# Patient Record
Sex: FEMALE | Race: WHITE | Employment: PART TIME | URBAN - METROPOLITAN AREA
[De-identification: names, ages, dates, MRNs, and addresses within clinical notes are randomized per-mention and may not be internally consistent; named-entity substitution may affect disease eponyms.]

---

## 2017-10-10 ENCOUNTER — OFFICE VISIT (OUTPATIENT)
Dept: FAMILY MEDICINE | Facility: CLINIC | Age: 22
End: 2017-10-10
Payer: COMMERCIAL

## 2017-10-10 VITALS
HEIGHT: 68 IN | BODY MASS INDEX: 25.46 KG/M2 | SYSTOLIC BLOOD PRESSURE: 128 MMHG | DIASTOLIC BLOOD PRESSURE: 75 MMHG | OXYGEN SATURATION: 98 % | WEIGHT: 168 LBS | HEART RATE: 96 BPM | TEMPERATURE: 97.9 F

## 2017-10-10 DIAGNOSIS — G43.809 OTHER MIGRAINE WITHOUT STATUS MIGRAINOSUS, NOT INTRACTABLE: Primary | ICD-10-CM

## 2017-10-10 DIAGNOSIS — Z11.3 SCREENING EXAMINATION FOR VENEREAL DISEASE: ICD-10-CM

## 2017-10-10 DIAGNOSIS — F41.9 ANXIETY: ICD-10-CM

## 2017-10-10 LAB
ERYTHROCYTE [DISTWIDTH] IN BLOOD BY AUTOMATED COUNT: 13 % (ref 10–15)
HCT VFR BLD AUTO: 42.3 % (ref 35–47)
HGB BLD-MCNC: 14.3 G/DL (ref 11.7–15.7)
MCH RBC QN AUTO: 31.2 PG (ref 26.5–33)
MCHC RBC AUTO-ENTMCNC: 33.8 G/DL (ref 31.5–36.5)
MCV RBC AUTO: 92 FL (ref 78–100)
PLATELET # BLD AUTO: 345 10E9/L (ref 150–450)
RBC # BLD AUTO: 4.58 10E12/L (ref 3.8–5.2)
WBC # BLD AUTO: 7.1 10E9/L (ref 4–11)

## 2017-10-10 PROCEDURE — 36415 COLL VENOUS BLD VENIPUNCTURE: CPT | Performed by: PHYSICIAN ASSISTANT

## 2017-10-10 PROCEDURE — 87591 N.GONORRHOEAE DNA AMP PROB: CPT | Performed by: PHYSICIAN ASSISTANT

## 2017-10-10 PROCEDURE — 85027 COMPLETE CBC AUTOMATED: CPT | Performed by: PHYSICIAN ASSISTANT

## 2017-10-10 PROCEDURE — 99214 OFFICE O/P EST MOD 30 MIN: CPT | Performed by: PHYSICIAN ASSISTANT

## 2017-10-10 PROCEDURE — 87491 CHLMYD TRACH DNA AMP PROBE: CPT | Performed by: PHYSICIAN ASSISTANT

## 2017-10-10 RX ORDER — PROPRANOLOL HYDROCHLORIDE 80 MG/1
80 CAPSULE, EXTENDED RELEASE ORAL DAILY
Qty: 30 CAPSULE | Refills: 1 | Status: SHIPPED | OUTPATIENT
Start: 2017-10-10 | End: 2018-02-05

## 2017-10-10 RX ORDER — SUMATRIPTAN 25 MG/1
25-50 TABLET, FILM COATED ORAL
Qty: 18 TABLET | Refills: 1 | Status: SHIPPED | OUTPATIENT
Start: 2017-10-10 | End: 2017-12-16

## 2017-10-10 NOTE — PROGRESS NOTES
SUBJECTIVE:   Yenny Ortiz is a 21 year old female who presents to clinic today for the following health issues:      Headache  Onset: Mutliple Years, getting worse again recently     Description:   Location: bilateral in the occipital area, mostly behind left eye   Character: throbbing pain, sharp pain, squeezing pain  Frequency:  Every few days  Duration:  The whole day    Intensity: severe    Progression of Symptoms:  worsening    Accompanying Signs & Symptoms:  Stiff neck: no  Neck or upper back pain: no   Fever: no   Sinus pressure: no   Nausea or vomiting: no   Dizziness: YES-room spinning and eyes hurt   Numbness: YES-side of face and hand-left side  Weakness: YES  Visual changes: YES    History:   Head trauma: no   Family history of migraines: YES- grandmother  Previous tests for headaches: YES  Neurologist evaluations: YES-MRI was normal.  This was when she was 17.  Since then the sharp pain in eye and numbness is new   Able to do daily activities: no   Wake with a headaches: YES  Do headaches wake you up: no   Daily pain medication use: YES- OTC tylenol and ibuprofen   Work/school stressors/changes: no    Precipitating factors:   Does light make it worse: YES  Does sound make it worse: YES    Alleviating factors:  Does sleep help: YES- sometimes it does and sometimes it doesn't    Therapies Tried and outcome: Ibuprofen (Advil, Motrin) and Tylenol-not helping  Not related to cycle or sleep.  Drinks coffee-lots.  Not related to intake.  Sometimes drinking more helps.  Vision fine otherwise.     Some chest pain or shortness of breath-thinks it is anxiety related          Problem list and histories reviewed & adjusted, as indicated.  Additional history: as documented    Patient Active Problem List   Diagnosis     Contraception     History reviewed. No pertinent surgical history.    Social History   Substance Use Topics     Smoking status: Never Smoker     Smokeless tobacco: Never Used     Alcohol use  "No     History reviewed. No pertinent family history.          Reviewed and updated as needed this visit by clinical staffTobajose  Meds  Med Hx  Surg Hx  Fam Hx  Soc Hx      Reviewed and updated as needed this visit by Provider         ROS:  As above    OBJECTIVE:     /75 (BP Location: Left arm, Patient Position: Chair, Cuff Size: Adult Regular)  Pulse 96  Temp 97.9  F (36.6  C) (Oral)  Ht 5' 8.27\" (1.734 m)  Wt 168 lb (76.2 kg)  LMP 09/25/2017  SpO2 98%  Breastfeeding? No  BMI 25.34 kg/m2  Body mass index is 25.34 kg/(m^2).  GENERAL: healthy, alert and no distress  EYES: Eyes grossly normal to inspection, PERRL and conjunctivae and sclerae normal  NECK: no adenopathy, no asymmetry, masses, or scars and thyroid normal to palpation  RESP: lungs clear to auscultation - no rales, rhonchi or wheezes  CV: regular rates and rhythm, normal S1 S2, no S3 or S4 and no murmur, click or rub  NEURO: Normal strength and tone, mentation intact, cranial nerves 2-12 intact and DTR's normal and symmetric knees, finger to nose normal, alternating movements normal   PSYCH: mentation appears normal, affect normal/bright    Diagnostic Test Results:  none     ASSESSMENT/PLAN:       1. Other migraine without status migrainosus, not intractable  Follow up after labs are back but sounds like a migraine.  Start propanolol to help with anxiety as well.  Monitor pulse and blood pressure.  imitrex as needed  - CBC with platelets  - SUMAtriptan (IMITREX) 25 MG tablet; Take 1-2 tablets (25-50 mg) by mouth at onset of headache for migraine May repeat in 2 hours. Max 8 tablets/24 hours.  Dispense: 18 tablet; Refill: 1  - propranolol (INDERAL LA) 80 MG 24 hr capsule; Take 1 capsule (80 mg) by mouth daily  Dispense: 30 capsule; Refill: 1    2. Screening examination for venereal disease    - NEISSERIA GONORRHOEA PCR  - CHLAMYDIA TRACHOMATIS PCR    3. Anxiety  As above      FUTURE APPOINTMENTS:       - Follow-up visit in 1 " month    Lise Mancilla PA-C  [facility]

## 2017-10-10 NOTE — MR AVS SNAPSHOT
"              After Visit Summary   10/10/2017    Yenny Ortiz    MRN: [id number]           Patient Information     Date Of Birth          1995        Visit Information        Provider Department      10/10/2017 11:40 AM Lise Mancilla PA-C [facility]        Today's Diagnoses     Other migraine without status migrainosus, not intractable    -  1    Screening examination for venereal disease           Follow-ups after your visit        Follow-up notes from your care team     Return in about 4 weeks (around 11/7/2017) for migraines .      Who to contact     If you have questions or need follow up information about today's clinic visit or your schedule please contact [facility] directly at [phone].  Normal or non-critical lab and imaging results will be communicated to you by [employer], letter or phone within 4 business days after the clinic has received the results. If you do not hear from us within 7 days, please contact the clinic through [employer]hart or phone. If you have a critical or abnormal lab result, we will notify you by phone as soon as possible.  Submit refill requests through [employer] or call your pharmacy and they will forward the refill request to us. Please allow 3 business days for your refill to be completed.          Additional Information About Your Visit        MyChart Information     [employer] lets you send messages to your doctor, view your test results, renew your prescriptions, schedule appointments and more. To sign up, go to [email or web address] . Click on \"Log in\" on the left side of the screen, which will take you to the Welcome page. Then click on \"Sign up Now\" on the right side of the page.     You will be asked to enter the access code listed below, as well as some personal information. Please follow the directions to create your username and password.     Your access code is: [email or web address]  Expires: 1/8/2018 12:25 PM   " "  Your access code will  in 90 days. If you need help or a new code, please call your [facility] clinic or [phone].        Care EveryWhere ID     This is your Care EveryWhere ID. This could be used by other organizations to access your [facility] medical records  [id number]        Your Vitals Were     Pulse Temperature Height Last Period Pulse Oximetry Breastfeeding?    96 97.9  F (36.6  C) (Oral) 5' 8.27\" (1.734 m) 2017 98% No    BMI (Body Mass Index)                   25.34 kg/m2            Blood Pressure from Last 3 Encounters:   10/10/17 128/75   16 134/82   04/15/16 124/68    Weight from Last 3 Encounters:   10/10/17 168 lb (76.2 kg) (91 %)*   16 175 lb (79.4 kg) (94 %)*   04/15/16 174 lb (78.9 kg) (94 %)*     * Growth percentiles are based on [facility][note 2] 2-20 Years data.              We Performed the Following     CBC with platelets     CHLAMYDIA TRACHOMATIS PCR     NEISSERIA GONORRHOEA PCR          Today's Medication Changes          These changes are accurate as of: 10/10/17 12:25 PM.  If you have any questions, ask your nurse or doctor.               Start taking these medicines.        Dose/Directions    propranolol 80 MG 24 hr capsule   Commonly known as:  INDERAL LA   Used for:  Other migraine without status migrainosus, not intractable   Started by:  Lise Mancilla PA-C        Dose:  80 mg   Take 1 capsule (80 mg) by mouth daily   Quantity:  30 capsule   Refills:  1       SUMAtriptan 25 MG tablet   Commonly known as:  IMITREX   Used for:  Other migraine without status migrainosus, not intractable   Started by:  Lise Mancilla PA-C        Dose:  25-50 mg   Take 1-2 tablets (25-50 mg) by mouth at onset of headache for migraine May repeat in 2 hours. Max 8 tablets/24 hours.   Quantity:  18 tablet   Refills:  1            Where to get your medicines      These medications were sent to [facility]/pharmacy #[id number] - [place], [place] - [facility] [address] [facility] [address]     Phone:  [phone]     propranolol 80 MG 24 hr capsule    SUMAtriptan 25 MG tablet                Primary Care Provider    None Specified       No primary provider on file.        Equal Access to Services     CHE MANCINI : Hadii aad ku hadsabrina Hancock, waliamda luqadaha, qaybta kaalmada adedayna, will rodriguez chandnipatrick abdullahi alin vilchis. So [facility] [phone].    ATENCIÓN: Si habla español, tiene a polanco disposición servicios gratuitos de asistencia lingüística. Llame al [phone].    We comply with applicable federal civil rights laws and [place] laws. We do not discriminate on the basis of race, color, national origin, age, disability, sex, sexual orientation, or gender identity.            Thank you!     Thank you for choosing [facility]  for your care. Our goal is always to provide you with excellent care. Hearing back from our patients is one way we can continue to improve our services. Please take a few minutes to complete the written survey that you may receive in the mail after your visit with us. Thank you!             Your Updated Medication List - Protect others around you: Learn how to safely use, store and throw away your medicines at www.disposemymeds.org.          This list is accurate as of: 10/10/17 12:25 PM.  Always use your most recent med list.                   Brand Name Dispense Instructions for use Diagnosis    propranolol 80 MG 24 hr capsule    INDERAL LA    30 capsule    Take 1 capsule (80 mg) by mouth daily    Other migraine without status migrainosus, not intractable       SUMAtriptan 25 MG tablet    IMITREX    18 tablet    Take 1-2 tablets (25-50 mg) by mouth at onset of headache for migraine May repeat in 2 hours. Max 8 tablets/24 hours.    Other migraine without status migrainosus, not intractable

## 2017-10-10 NOTE — LETTER
[facility]  [address] [place]  [address]  [phone]        October 11, 2017    Yenny Ortiz  [facility] [address] [facility] [address]  [facility] [address]        Dear Yenny,    Your sexually transmitted disease testing and your blood work are normal.    Results for orders placed or performed in visit on 10/10/17   CBC with platelets   Result Value Ref Range    WBC 7.1 4.0 - 11.0 10e9/L    RBC Count 4.58 3.8 - 5.2 10e12/L    Hemoglobin 14.3 11.7 - 15.7 g/dL    Hematocrit 42.3 35.0 - 47.0 %    MCV 92 78 - 100 fl    MCH 31.2 26.5 - 33.0 pg    MCHC 33.8 31.5 - 36.5 g/dL    RDW 13.0 10.0 - 15.0 %    Platelet Count 345 150 - 450 10e9/L   NEISSERIA GONORRHOEA PCR   Result Value Ref Range    Specimen Descrip Urine     N Gonorrhea PCR Negative NEG^Negative   CHLAMYDIA TRACHOMATIS PCR   Result Value Ref Range    Specimen Description Urine     Chlamydia Trachomatis PCR Negative NEG^Negative       If you have any questions please call the clinic at [phone].    Sincerely,    Lise PELLETIER

## 2017-10-10 NOTE — NURSING NOTE
"Chief Complaint   Patient presents with     Headache     Health Maintenance     TD, Chlamydia, HPV, and Influenza        Initial /75 (BP Location: Left arm, Patient Position: Chair, Cuff Size: Adult Regular)  Pulse 96  Temp 97.9  F (36.6  C) (Oral)  Ht 5' 8.27\" (1.734 m)  Wt 168 lb (76.2 kg)  LMP 09/25/2017  SpO2 98%  Breastfeeding? No  BMI 25.34 kg/m2 Estimated body mass index is 25.34 kg/(m^2) as calculated from the following:    Height as of this encounter: 5' 8.27\" (1.734 m).    Weight as of this encounter: 168 lb (76.2 kg).  Medication Reconciliation: guera Green MA      "

## 2017-10-11 LAB
C TRACH DNA SPEC QL NAA+PROBE: NEGATIVE
N GONORRHOEA DNA SPEC QL NAA+PROBE: NEGATIVE
SPECIMEN SOURCE: NORMAL
SPECIMEN SOURCE: NORMAL

## 2017-12-16 DIAGNOSIS — G43.809 OTHER MIGRAINE WITHOUT STATUS MIGRAINOSUS, NOT INTRACTABLE: ICD-10-CM

## 2017-12-19 RX ORDER — SUMATRIPTAN 25 MG/1
TABLET, FILM COATED ORAL
Qty: 18 TABLET | Refills: 0 | Status: SHIPPED | OUTPATIENT
Start: 2017-12-19 | End: 2018-02-15

## 2017-12-19 NOTE — TELEPHONE ENCOUNTER
Requested Prescriptions   Pending Prescriptions Disp Refills     SUMAtriptan (IMITREX) 25 MG tablet [Pharmacy Med Name: SUMATRIPTAN SUCC 25 MG TABLET]                                             Last Written Prescription Date:  10/10/17  Last Fill Quantity: 18,  # refills: 1   [place] [facility] prescribing provider:  10/10/17   Future Office Visit:      18 tablet 1     Sig: TAKE 1-2 TABLETS BY MOUTH AT ONSET OF HEADACHE FORMIGRAINE. MAY REPEAT IN 2 HRS. MAX 8 TAB/24 HRS    There is no refill protocol information for this order        Routing refill request to provider for review/approval because:  Drug not on the [facility] [place]   Patient overdue for 1 month follow up    Kiarra Page RN  [facility]

## 2018-02-15 DIAGNOSIS — G43.809 OTHER MIGRAINE WITHOUT STATUS MIGRAINOSUS, NOT INTRACTABLE: ICD-10-CM

## 2018-02-15 RX ORDER — SUMATRIPTAN 25 MG/1
TABLET, FILM COATED ORAL
Qty: 18 TABLET | Refills: 0 | Status: SHIPPED | OUTPATIENT
Start: 2018-02-15 | End: 2018-05-01

## 2018-02-15 NOTE — TELEPHONE ENCOUNTER
"Requested Prescriptions   Pending Prescriptions Disp Refills     SUMAtriptan (IMITREX) 25 MG tablet [Pharmacy Med Name: SUMATRIPTAN SUCC 25 MG TABLET] 18 tablet 0    Last Written Prescription Date:  12-19-17  Last Fill Quantity: 18,  # refills: 0   Last office visit: 10/10/2017 with prescribing provider:     Future Office Visit:     Sig: TAKE 1 TO 2 TABS BY MOUTH AT ONSET OF HEADACHE,MAY REPEAT IN2HRS,MAX 8 IN 24HRS    Serotonin Agonists Failed    2/15/2018 11:13 AM       Failed - Serotonin Agonist request needs review.    Please review patient's record. If patient has had 8 or more treatments in the past month, please forward to provider.         Passed - Blood pressure under 140/90 in past 12 months    BP Readings from Last 3 Encounters:   10/10/17 128/75   05/24/16 134/82   04/15/16 124/68                Passed - Recent or future visit with authorizing provider's specialty    Patient had office visit in the last year or has a visit in the next 30 days with authorizing provider.  See \"Patient Info\" tab in inbasket, or \"Choose Columns\" in Meds & Orders section of the refill encounter.            Passed - Patient is age 18 or older       Passed - No active pregnancy on record       Passed - No positive pregnancy test in past 12 months          "

## 2018-02-15 NOTE — TELEPHONE ENCOUNTER
Medication is being filled for 1 time refill only due to:  Patient needs to be seen because last visit advise 1 mo.

## 2018-05-01 ENCOUNTER — TELEPHONE (OUTPATIENT)
Dept: FAMILY MEDICINE | Facility: CLINIC | Age: 23
End: 2018-05-01

## 2018-05-01 DIAGNOSIS — G43.809 OTHER MIGRAINE WITHOUT STATUS MIGRAINOSUS, NOT INTRACTABLE: ICD-10-CM

## 2018-05-01 NOTE — LETTER
[address] [address] [phone]      May 10, 2018    Yenny Ortiz                                                                                                                     [address] [place] [address] [address]  [facility] [address]            Dear Yenny,    We have tried to contact you, but have not been able to connect with you.    We were calling to let you know that we received a refill request for a medication.    We were able to send in a supply to your pharmacy, but the provider noted that you will need to be seen for a follow up in order to continue the medication.    Please call us at [phone] if you have any questions or to schedule an appointment.    Thank you      Sincerely,         Lise PELLETIER

## 2018-05-02 NOTE — TELEPHONE ENCOUNTER
"Requested Prescriptions   Pending Prescriptions Disp Refills     SUMAtriptan (IMITREX) 25 MG tablet [Pharmacy Med Name: SUMATRIPTAN SUCC 25 MG TABLET] 18 tablet 0    Last Written Prescription Date:  2-15-18  Last Fill Quantity: 18,  # refills: 0   Last office visit: 10/10/2017 with prescribing provider:     Future Office Visit:     Sig: TAKE 1 TO 2 TABS BY MOUTH AT ONSET OF HEADACHE,MAY REPEAT IN2HRS,MAX 8 IN 24HRS    Serotonin Agonists Failed    5/1/2018  3:43 PM       Failed - Serotonin Agonist request needs review.    Please review patient's record. If patient has had 8 or more treatments in the past month, please forward to provider.         Passed - Blood pressure under 140/90 in past 12 months    BP Readings from Last 3 Encounters:   10/10/17 128/75   05/24/16 134/82   04/15/16 124/68                Passed - Recent (12 mo) or future (30 days) visit within the authorizing provider's specialty    Patient had office visit in the last 12 months or has a visit in the next 30 days with authorizing provider or within the authorizing provider's specialty.  See \"Patient Info\" tab in inbasket, or \"Choose Columns\" in Meds & Orders section of the refill encounter.           Passed - Patient is age 18 or older       Passed - No active pregnancy on record       Passed - No positive pregnancy test in past 12 months        propranolol (INDERAL LA) 80 MG 24 hr capsule [Pharmacy Med Name: PROPRANOLOL ER 80 MG CAPSULE] 15 capsule 0    Last Written Prescription Date:  3-30-18  Last Fill Quantity: 15,  # refills: 0   Last office visit: 10/10/2017 with prescribing provider:     Future Office Visit:     Sig: TAKE 1 CAPSULE (80 MG) BY MOUTH DAILY DUE FOR OFFICE VISIT    Beta-Blockers Protocol Passed    5/1/2018  3:43 PM       Passed - Blood pressure under 140/90 in past 12 months    BP Readings from Last 3 Encounters:   10/10/17 128/75   05/24/16 134/82   04/15/16 124/68                Passed - Patient is age 6 or older       Passed " "- Recent (12 mo) or future (30 days) visit within the authorizing provider's specialty    Patient had office visit in the last 12 months or has a visit in the next 30 days with authorizing provider or within the authorizing provider's specialty.  See \"Patient Info\" tab in inbasket, or \"Choose Columns\" in Meds & Orders section of the refill encounter.              "

## 2018-05-04 RX ORDER — SUMATRIPTAN 25 MG/1
TABLET, FILM COATED ORAL
Qty: 9 TABLET | Refills: 0 | Status: SHIPPED | OUTPATIENT
Start: 2018-05-04 | End: 2018-07-13

## 2018-05-04 RX ORDER — PROPRANOLOL HYDROCHLORIDE 80 MG/1
CAPSULE, EXTENDED RELEASE ORAL
Qty: 15 CAPSULE | Refills: 0 | OUTPATIENT
Start: 2018-05-04

## 2018-05-04 NOTE — TELEPHONE ENCOUNTER
Message left on home number for patient to call back TC line.  NTBS for 6 month, migraine check with provider.    Talya BALTAZAR

## 2018-05-16 ENCOUNTER — TELEPHONE (OUTPATIENT)
Dept: FAMILY MEDICINE | Facility: CLINIC | Age: 23
End: 2018-05-16

## 2018-05-16 DIAGNOSIS — G43.809 OTHER MIGRAINE WITHOUT STATUS MIGRAINOSUS, NOT INTRACTABLE: ICD-10-CM

## 2018-05-16 RX ORDER — PROPRANOLOL HYDROCHLORIDE 80 MG/1
CAPSULE, EXTENDED RELEASE ORAL
Qty: 15 CAPSULE | Refills: 0 | OUTPATIENT
Start: 2018-05-16

## 2018-05-16 RX ORDER — SUMATRIPTAN 25 MG/1
TABLET, FILM COATED ORAL
Qty: 18 TABLET | Refills: 0 | OUTPATIENT
Start: 2018-05-16

## 2018-05-16 NOTE — TELEPHONE ENCOUNTER
"Requested Prescriptions   Pending Prescriptions Disp Refills     propranolol (INDERAL LA) 80 MG 24 hr capsule [Pharmacy Med Name: PROPRANOLOL ER 80 MG CAPSULE] 15 capsule 0    Last Written Prescription Date:  3-30-18  Last Fill Quantity: 15,  # refills: 0   Last office visit: 10/10/2017 with prescribing provider:     Future Office Visit:     Sig: TAKE 1 CAPSULE (80 MG) BY MOUTH DAILY DUE FOR OFFICE VISIT    Beta-Blockers Protocol Passed    5/16/2018  9:59 AM       Passed - Blood pressure under 140/90 in past 12 months    BP Readings from Last 3 Encounters:   10/10/17 128/75   05/24/16 134/82   04/15/16 124/68                Passed - Patient is age 6 or older       Passed - Recent (12 mo) or future (30 days) visit within the authorizing provider's specialty    Patient had office visit in the last 12 months or has a visit in the next 30 days with authorizing provider or within the authorizing provider's specialty.  See \"Patient Info\" tab in inbasket, or \"Choose Columns\" in Meds & Orders section of the refill encounter.            SUMAtriptan (IMITREX) 25 MG tablet [Pharmacy Med Name: SUMATRIPTAN SUCC 25 MG TABLET] 18 tablet 0    Last Written Prescription Date:  5-4-18  Last Fill Quantity: 9,  # refills: 0   Last office visit: 10/10/2017 with prescribing provider:     Future Office Visit:     Sig: TAKE 1 TO 2 TABS BY MOUTH AT ONSET OF HEADACHE,MAY REPEAT IN2HRS,MAX 8 IN 24HRS    Serotonin Agonists Failed    5/16/2018  9:59 AM       Failed - Serotonin Agonist request needs review.    Please review patient's record. If patient has had 8 or more treatments in the past month, please forward to provider.         Passed - Blood pressure under 140/90 in past 12 months    BP Readings from Last 3 Encounters:   10/10/17 128/75   05/24/16 134/82   04/15/16 124/68                Passed - Recent (12 mo) or future (30 days) visit within the authorizing provider's specialty    Patient had office visit in the last 12 months or has a " "visit in the next 30 days with authorizing provider or within the authorizing provider's specialty.  See \"Patient Info\" tab in inbasket, or \"Choose Columns\" in Meds & Orders section of the refill encounter.           Passed - Patient is age 18 or older       Passed - No active pregnancy on record       Passed - No positive pregnancy test in past 12 months          "

## 2018-05-16 NOTE — TELEPHONE ENCOUNTER
"Refused medications as \"patient needs appointment\".  Last OV was 10/10/2017  On 2/23/2018 patient no showed scheduled appointment.    Routing to provider  Fabiola Uribe RN  [facility]      "

## 2018-05-23 ENCOUNTER — OFFICE VISIT (OUTPATIENT)
Dept: FAMILY MEDICINE | Facility: CLINIC | Age: 23
End: 2018-05-23

## 2018-05-23 VITALS
WEIGHT: 170 LBS | OXYGEN SATURATION: 96 % | HEIGHT: 68 IN | DIASTOLIC BLOOD PRESSURE: 79 MMHG | HEART RATE: 104 BPM | TEMPERATURE: 97.8 F | SYSTOLIC BLOOD PRESSURE: 119 MMHG | BODY MASS INDEX: 25.76 KG/M2

## 2018-05-23 DIAGNOSIS — G43.809 OTHER MIGRAINE WITHOUT STATUS MIGRAINOSUS, NOT INTRACTABLE: ICD-10-CM

## 2018-05-23 DIAGNOSIS — R07.0 THROAT PAIN: ICD-10-CM

## 2018-05-23 DIAGNOSIS — R09.89 UPPER RESPIRATORY SYMPTOM: Primary | ICD-10-CM

## 2018-05-23 LAB
DEPRECATED S PYO AG THROAT QL EIA: NORMAL
SPECIMEN SOURCE: NORMAL

## 2018-05-23 PROCEDURE — 87081 CULTURE SCREEN ONLY: CPT | Performed by: FAMILY MEDICINE

## 2018-05-23 PROCEDURE — 99213 OFFICE O/P EST LOW 20 MIN: CPT | Performed by: FAMILY MEDICINE

## 2018-05-23 PROCEDURE — 87880 STREP A ASSAY W/OPTIC: CPT | Performed by: FAMILY MEDICINE

## 2018-05-23 RX ORDER — SUMATRIPTAN 25 MG/1
TABLET, FILM COATED ORAL
Qty: 9 TABLET | Refills: 0 | Status: CANCELLED | OUTPATIENT
Start: 2018-05-23

## 2018-05-23 RX ORDER — PROPRANOLOL HYDROCHLORIDE 80 MG/1
CAPSULE, EXTENDED RELEASE ORAL
Qty: 15 CAPSULE | Refills: 0 | Status: SHIPPED | OUTPATIENT
Start: 2018-05-23 | End: 2018-07-13

## 2018-05-23 ASSESSMENT — PAIN SCALES - GENERAL: PAINLEVEL: SEVERE PAIN (7)

## 2018-05-23 NOTE — LETTER
[facility]   [address] [place]  [address]  [phone]                                   May 24, 2018    Yenny Ortiz  [address] [place] [address] [address] [facility] [address]  [facility] [address]        Dear Yenny,    Your throat culture is normal, you do not have strep throat.     Results for orders placed or performed in visit on 05/23/18   Strep, Rapid Screen   Result Value Ref Range    Specimen Description Throat     Rapid Strep A Screen       NEGATIVE: No Group A streptococcal antigen detected by immunoassay, await culture report.   Beta strep group A culture   Result Value Ref Range    Specimen Description Throat     Culture Micro No beta hemolytic Streptococcus Group A isolated        If you have any questions please call the clinic at [phone]    Sincerely,    Olya Mcnamara MD  bmd

## 2018-05-23 NOTE — LETTER
[facility]   [address] [place]  [address]  [phone]                                   May 24, 2018    Yenny Ortiz  [address] [place] [address] [address] [facility] [address]  [facility] [address]        Dear Yenny,    Your throat culture is normal, you do not have strep throat.     Results for orders placed or performed in visit on 05/23/18   Strep, Rapid Screen   Result Value Ref Range    Specimen Description Throat     Rapid Strep A Screen       NEGATIVE: No Group A streptococcal antigen detected by immunoassay, await culture report.   Beta strep group A culture   Result Value Ref Range    Specimen Description Throat     Culture Micro No beta hemolytic Streptococcus Group A isolated        If you have any questions please call the clinic at [phone]    Sincerely,    Olya Mcnamara MD  bmd

## 2018-05-23 NOTE — PROGRESS NOTES
Results discussed with patient during the clinic visit.     .Olya Mcnamara MD.   Family Physician.  [facility].

## 2018-05-23 NOTE — PROGRESS NOTES
SUBJECTIVE:   Yenny Ortiz is a 22 year old female who presents to clinic today for the following health issues:    ENT Symptoms :            Symptoms: cc Present Absent Comment   Fever/Chills   x    Fatigue   x    Muscle Aches   x    Eye Irritation   x    Sneezing  x     Nasal Sergio/Drg  x     Sinus Pressure/Pain   x    Loss of smell   x    Dental pain   x    Sore Throat  x     Swollen Glands  x  Left side   Ear Pain/Fullness   x    Cough  x     Wheeze   x    Chest Pain   x    Shortness of breath   x    Rash   x    Other         Symptom duration:  3 days   Symptom severity:  moderate   Treatments tried:  tylenol   Contacts:  sister.       Problem list and histories reviewed & adjusted, as indicated.  Additional history: as documented    Patient Active Problem List   Diagnosis     Contraception     Other migraine without status migrainosus, not intractable     Anxiety     History reviewed. No pertinent surgical history.    Social History   Substance Use Topics     Smoking status: Never Smoker     Smokeless tobacco: Never Used     Alcohol use No     History reviewed. No pertinent family history.      Current Outpatient Prescriptions   Medication Sig Dispense Refill     propranolol (INDERAL LA) 80 MG 24 hr capsule TAKE 1 CAPSULE (80 MG) BY MOUTH DAILY DUE FOR OFFICE VISIT (Patient not taking: Reported on 5/23/2018) 15 capsule 0     SUMAtriptan (IMITREX) 25 MG tablet TAKE 1 TO 2 TABS BY MOUTH AT ONSET OF HEADACHE,MAY REPEAT IN2HRS,MAX 8 IN 24HRS (Patient not taking: Reported on 5/23/2018) 9 tablet 0     Allergies   Allergen Reactions     Amoxicillin      No lab results found.   BP Readings from Last 3 Encounters:   05/23/18 119/79   10/10/17 128/75   05/24/16 134/82    Wt Readings from Last 3 Encounters:   05/23/18 170 lb (77.1 kg)   10/10/17 168 lb (76.2 kg) (91 %)*   05/24/16 175 lb (79.4 kg) (94 %)*     * Growth percentiles are based on CDC 2-20 Years data.                  Labs reviewed in EPIC    Reviewed  "and updated as needed this visit by clinical staff       Reviewed and updated as needed this visit by Provider         ROS:  Constitutional, HEENT, cardiovascular, pulmonary, gi and gu systems are negative, except as otherwise noted.    OBJECTIVE:     /79 (BP Location: Right arm, Patient Position: Sitting, Cuff Size: Adult Regular)  Pulse 104  Temp 97.8  F (36.6  C) (Oral)  Ht 5' 8.25\" (1.734 m)  Wt 170 lb (77.1 kg)  SpO2 96%  BMI 25.66 kg/m2  Body mass index is 25.66 kg/(m^2).  GENERAL: healthy, alert and no distress  NECK: no adenopathy, no asymmetry, masses, or scars and thyroid normal to palpation  RESP: lungs clear to auscultation - no rales, rhonchi or wheezes  CV: regular rate and rhythm, normal S1 S2, no S3 or S4, no murmur, click or rub, no peripheral edema and peripheral pulses strong  ABDOMEN: soft, nontender, no hepatosplenomegaly, no masses and bowel sounds normal  MS: no gross musculoskeletal defects noted, no edema    Results for orders placed or performed in visit on 05/23/18   Strep, Rapid Screen   Result Value Ref Range    Specimen Description Throat     Rapid Strep A Screen       NEGATIVE: No Group A streptococcal antigen detected by immunoassay, await culture report.         ASSESSMENT/PLAN:       ICD-10-CM    1. Upper respiratory symptom R09.89    2. Throat pain R07.0 Strep, Rapid Screen     Beta strep group A culture   3. Other migraine without status migrainosus, not intractable G43.809 propranolol (INDERAL LA) 80 MG 24 hr capsule     Symptomatic Rx. F/U PRN.     Scheduled to see PCP for migraine f/u.       Olya Mcnamara MD  [facility]  "

## 2018-05-23 NOTE — MR AVS SNAPSHOT
After Visit Summary   5/23/2018    Yenny Ortiz    MRN: [id number]           Patient Information     Date Of Birth          1995        Visit Information        Provider Department      5/23/2018 11:20 AM Olya Mcnamara MD [facility]        Today's Diagnoses     Viral upper respiratory tract infection    -  1    Throat pain        Other migraine without status migrainosus, not intractable           Follow-ups after your visit        Your next 10 appointments already scheduled     Jun 06, 2018 11:00 AM CDT   Office Visit with Lise Mancilla PA-C   [facility] ([facility])    [address] [facility] [id number] [phone]           Bring a current list of meds and any records pertaining to this visit. For Physicals, please bring immunization records and any forms needing to be filled out. Please arrive 10 minutes early to complete paperwork.              Who to contact     If you have questions or need follow up information about today's clinic visit or your schedule please contact [facility] directly at [phone].  Normal or non-critical lab and imaging results will be communicated to you by [employer], letter or phone within 4 business days after the clinic has received the results. If you do not hear from us within 7 days, please contact the clinic through [employer] or phone. If you have a critical or abnormal lab result, we will notify you by phone as soon as possible.  Submit refill requests through [employer] or call your pharmacy and they will forward the refill request to us. Please allow 3 business days for your refill to be completed.          Additional Information About Your Visit        [employer] Information     [employer] lets you send messages to your doctor, view your test results, renew your prescriptions, schedule appointments and more. To sign  "up, go to [email or web address] . Click on \"Log in\" on the left side of the screen, which will take you to the Welcome page. Then click on \"Sign up Now\" on the right side of the page.     You will be asked to enter the access code listed below, as well as some personal information. Please follow the directions to create your username and password.     Your access code is: [email or web address]  Expires: 2018  3:29 PM     Your access code will  in 90 days. If you need help or a new code, please call your [place] clinic or [phone].        Care EveryWhere ID     This is your Care EveryWhere ID. This could be used by other organizations to access your [place] medical records  [id number]        Your Vitals Were     Pulse Temperature Height Pulse Oximetry BMI (Body Mass Index)       104 97.8  F (36.6  C) (Oral) 5' 8.25\" (1.734 m) 96% 25.66 kg/m2        Blood Pressure from Last 3 Encounters:   18 119/79   10/10/17 128/75   16 134/82    Weight from Last 3 Encounters:   18 170 lb (77.1 kg)   10/10/17 168 lb (76.2 kg) (91 %)*   16 175 lb (79.4 kg) (94 %)*     * Growth percentiles are based on CDC 2-20 Years data.              We Performed the Following     Beta strep group A culture     Strep, Rapid Screen          Where to get your medicines      These medications were sent to [facility]/pharmacy #[id number] - [place] [address]NER OF [address] [place] [address]     Phone:  [phone]     propranolol 80 MG 24 hr capsule          Primary Care Provider Office Phone # Fax #    [facility] [phone]       [address] [place] [address]        Equal Access to Services     CHE MANCINI : princess An qaybta kaalmada adeegyada, waxay idiin hayaan adeeg kharash la'aan ah. So [facility] [phone].    ATENCIÓN: Si habla español, tiene a polanco disposición servicios gratuitos de asistencia " lingüística. Jeromy al [phone].    We comply with applicable federal civil rights laws and [place] laws. We do not discriminate on the basis of race, color, national origin, age, disability, sex, sexual orientation, or gender identity.            Thank you!     Thank you for choosing [facility]  for your care. Our goal is always to provide you with excellent care. Hearing back from our patients is one way we can continue to improve our services. Please take a few minutes to complete the written survey that you may receive in the mail after your visit with us. Thank you!             Your Updated Medication List - Protect others around you: Learn how to safely use, store and throw away your medicines at www.disposemymeds.org.          This list is accurate as of 5/23/18 12:10 PM.  Always use your most recent med list.                   Brand Name Dispense Instructions for use Diagnosis    propranolol 80 MG 24 hr capsule    INDERAL LA    15 capsule    TAKE 1 CAPSULE (80 MG) BY MOUTH DAILY DUE FOR OFFICE VISIT    Other migraine without status migrainosus, not intractable       SUMAtriptan 25 MG tablet    IMITREX    9 tablet    TAKE 1 TO 2 TABS BY MOUTH AT ONSET OF HEADACHE,MAY REPEAT IN2HRS,MAX 8 IN 24HRS    Other migraine without status migrainosus, not intractable

## 2018-05-24 LAB
BACTERIA SPEC CULT: NORMAL
SPECIMEN SOURCE: NORMAL

## 2018-05-24 NOTE — PROGRESS NOTES
Dear Yenny Ortiz,     Your throat culture is normal, you do not have strep throat.     Olya Mcnamara MD.   Family Physician.  [facility] [place].

## 2018-05-24 NOTE — PROGRESS NOTES
Dear Yenny Ortiz,     Your throat culture is normal, you do not have strep throat.     Olya Mcnamara MD.   Family Physician.  [facility].

## 2018-07-13 ENCOUNTER — OFFICE VISIT (OUTPATIENT)
Dept: FAMILY MEDICINE | Facility: CLINIC | Age: 23
End: 2018-07-13
Payer: COMMERCIAL

## 2018-07-13 VITALS
WEIGHT: 173 LBS | DIASTOLIC BLOOD PRESSURE: 69 MMHG | BODY MASS INDEX: 26.11 KG/M2 | HEART RATE: 96 BPM | SYSTOLIC BLOOD PRESSURE: 104 MMHG | TEMPERATURE: 97.8 F

## 2018-07-13 DIAGNOSIS — F41.9 ANXIETY: ICD-10-CM

## 2018-07-13 DIAGNOSIS — L70.9 ACNE, UNSPECIFIED ACNE TYPE: ICD-10-CM

## 2018-07-13 DIAGNOSIS — F33.0 MILD EPISODE OF RECURRENT MAJOR DEPRESSIVE DISORDER (H): ICD-10-CM

## 2018-07-13 DIAGNOSIS — G43.809 OTHER MIGRAINE WITHOUT STATUS MIGRAINOSUS, NOT INTRACTABLE: Primary | ICD-10-CM

## 2018-07-13 PROCEDURE — 99214 OFFICE O/P EST MOD 30 MIN: CPT | Performed by: PHYSICIAN ASSISTANT

## 2018-07-13 RX ORDER — SERTRALINE HYDROCHLORIDE 25 MG/1
25 TABLET, FILM COATED ORAL DAILY
Qty: 30 TABLET | Refills: 0 | Status: SHIPPED | OUTPATIENT
Start: 2018-07-13 | End: 2018-08-21

## 2018-07-13 RX ORDER — CLINDAMYCIN PHOSPHATE 10 UG/ML
LOTION TOPICAL 2 TIMES DAILY
Qty: 60 ML | Refills: 1 | Status: SHIPPED | OUTPATIENT
Start: 2018-07-13 | End: 2019-02-06

## 2018-07-13 RX ORDER — SUMATRIPTAN 25 MG/1
TABLET, FILM COATED ORAL
Qty: 9 TABLET | Refills: 1 | Status: SHIPPED | OUTPATIENT
Start: 2018-07-13 | End: 2019-05-13

## 2018-07-13 RX ORDER — PROPRANOLOL HYDROCHLORIDE 80 MG/1
80 CAPSULE, EXTENDED RELEASE ORAL DAILY
Qty: 90 CAPSULE | Refills: 1 | Status: SHIPPED | OUTPATIENT
Start: 2018-07-13 | End: 2019-11-21

## 2018-07-13 ASSESSMENT — ANXIETY QUESTIONNAIRES
GAD7 TOTAL SCORE: 19
6. BECOMING EASILY ANNOYED OR IRRITABLE: NEARLY EVERY DAY
2. NOT BEING ABLE TO STOP OR CONTROL WORRYING: MORE THAN HALF THE DAYS
5. BEING SO RESTLESS THAT IT IS HARD TO SIT STILL: NEARLY EVERY DAY
IF YOU CHECKED OFF ANY PROBLEMS ON THIS QUESTIONNAIRE, HOW DIFFICULT HAVE THESE PROBLEMS MADE IT FOR YOU TO DO YOUR WORK, TAKE CARE OF THINGS AT HOME, OR GET ALONG WITH OTHER PEOPLE: VERY DIFFICULT
7. FEELING AFRAID AS IF SOMETHING AWFUL MIGHT HAPPEN: NEARLY EVERY DAY
1. FEELING NERVOUS, ANXIOUS, OR ON EDGE: NEARLY EVERY DAY
3. WORRYING TOO MUCH ABOUT DIFFERENT THINGS: NEARLY EVERY DAY

## 2018-07-13 ASSESSMENT — PATIENT HEALTH QUESTIONNAIRE - PHQ9: 5. POOR APPETITE OR OVEREATING: MORE THAN HALF THE DAYS

## 2018-07-13 NOTE — PROGRESS NOTES
SUBJECTIVE:   Yenny Ortiz is a 22 year old female who presents to clinic today for the following health issues:        Medication Followup of imitrex and refill     Taking Medication as prescribed: yes    Side Effects:  None    Medication Helping Symptoms:  yes   Getting headaches once a month.  No triggers.  No change in headaches.  No side effects from medications.  No dizziness.  No vision changes.      Anxiety is worsening.  Everything causes anxiety.  some depression.        Also concerned about acne.  Has had problems x years.  Tried lots of over the counter products.          Problem list and histories reviewed & adjusted, as indicated.  Additional history: as documented    Patient Active Problem List   Diagnosis     Contraception     Other migraine without status migrainosus, not intractable     Anxiety     Acne, unspecified acne type     History reviewed. No pertinent surgical history.    Social History   Substance Use Topics     Smoking status: Never Smoker     Smokeless tobacco: Never Used     Alcohol use No     History reviewed. No pertinent family history.        Reviewed and updated as needed this visit by clinical staff  Tobacco  Allergies  Meds  Med Hx  Surg Hx  Fam Hx  Soc Hx      Reviewed and updated as needed this visit by Provider         ROS:  As above    OBJECTIVE:     /69  Pulse 96  Temp 97.8  F (36.6  C) (Oral)  Wt 173 lb (78.5 kg)  LMP 06/13/2018  BMI 26.11 kg/m2  Body mass index is 26.11 kg/(m^2).  GENERAL: healthy, alert and no distress  HENT: ear canals and TM's normal, oropharynx clear and oral mucous membranes moist  NECK: no adenopathy, no asymmetry, masses, or scars and thyroid normal to palpation  RESP: lungs clear to auscultation - no rales, rhonchi or wheezes  CV: regular rates and rhythm, normal S1 S2, no S3 or S4 and no murmur, click or rub  SKIN: acne on face  NEURO: Normal strength and tone, mentation intact, cranial nerves 2-12 intact, DTR's normal  and symmetric knees and rapid alternating movements normal  PSYCH: mentation appears normal, affect normal/bright    Diagnostic Test Results:  none     ASSESSMENT/PLAN:       1. Other migraine without status migrainosus, not intractable  Stable.  refilled  - propranolol (INDERAL LA) 80 MG 24 hr capsule; Take 1 capsule (80 mg) by mouth daily  Dispense: 90 capsule; Refill: 1  - SUMAtriptan (IMITREX) 25 MG tablet; TAKE 1 TO 2 TABS BY MOUTH AT ONSET OF HEADACHE,MAY REPEAT IN2HRS,MAX 8 IN 24HRS  Dispense: 9 tablet; Refill: 1    2. Mild episode of recurrent major depressive disorder (H)  Start zoloft.  Follow up in 3 weeks    3. Anxiety  As above  - sertraline (ZOLOFT) 25 MG tablet; Take 1 tablet (25 mg) by mouth daily  Dispense: 30 tablet; Refill: 0    4. Acne, unspecified acne type  Continue over the counter face wash  - clindamycin (CLEOCIN-T) 1 % lotion; Apply topically 2 times daily  Dispense: 60 mL; Refill: 1    FUTURE APPOINTMENTS:       - Follow-up visit in 3 weeks    Lise Mancilla PA-C  [facility]

## 2018-07-13 NOTE — PROGRESS NOTES
"  SUBJECTIVE:   Yenny Ortiz is a 22 year old female who presents to clinic today for the following health issues:  {Provider please address medication reconciliation discrepancies--rooming staff please delete if no med/rec issues}    {Superlists:352637}    {additional problems for provider to add:095550}    Problem list and histories reviewed & adjusted, as indicated.  Additional history: {NONE - AS DOCUMENTED:674978::\"as documented\"}    {HIST REVIEW/ LINKS 2:440601}    Reviewed and updated as needed this visit by clinical staff       Reviewed and updated as needed this visit by Provider         {PROVIDER CHARTING PREFERENCE:621827}  "

## 2018-07-14 ASSESSMENT — PATIENT HEALTH QUESTIONNAIRE - PHQ9: SUM OF ALL RESPONSES TO PHQ QUESTIONS 1-9: 15

## 2018-07-14 ASSESSMENT — ANXIETY QUESTIONNAIRES: GAD7 TOTAL SCORE: 19

## 2018-07-20 ENCOUNTER — OFFICE VISIT (OUTPATIENT)
Dept: FAMILY MEDICINE | Facility: CLINIC | Age: 23
End: 2018-07-20
Payer: COMMERCIAL

## 2018-07-20 VITALS
DIASTOLIC BLOOD PRESSURE: 75 MMHG | SYSTOLIC BLOOD PRESSURE: 115 MMHG | TEMPERATURE: 98 F | OXYGEN SATURATION: 99 % | WEIGHT: 171 LBS | BODY MASS INDEX: 25.91 KG/M2 | HEIGHT: 68 IN | HEART RATE: 78 BPM

## 2018-07-20 DIAGNOSIS — R07.0 THROAT PAIN: Primary | ICD-10-CM

## 2018-07-20 LAB
DEPRECATED S PYO AG THROAT QL EIA: NORMAL
SPECIMEN SOURCE: NORMAL

## 2018-07-20 PROCEDURE — 87880 STREP A ASSAY W/OPTIC: CPT | Performed by: PHYSICIAN ASSISTANT

## 2018-07-20 PROCEDURE — 87081 CULTURE SCREEN ONLY: CPT | Performed by: PHYSICIAN ASSISTANT

## 2018-07-20 PROCEDURE — 99213 OFFICE O/P EST LOW 20 MIN: CPT | Performed by: PHYSICIAN ASSISTANT

## 2018-07-20 RX ORDER — FLUTICASONE PROPIONATE 50 MCG
1-2 SPRAY, SUSPENSION (ML) NASAL DAILY
Qty: 1 BOTTLE | Refills: 11 | Status: SHIPPED | OUTPATIENT
Start: 2018-07-20 | End: 2020-01-30

## 2018-07-20 NOTE — MR AVS SNAPSHOT
"              After Visit Summary   7/20/2018    Yenny Ortiz    MRN: [id number]           Patient Information     Date Of Birth          1995        Visit Information        Provider Department      7/20/2018 1:40 PM Lise Mancilla PA-C [facility]        Today's Diagnoses     Throat pain    -  1       Follow-ups after your visit        Who to contact     If you have questions or need follow up information about today's clinic visit or your schedule please contact [facility] directly at [phone].  Normal or non-critical lab and imaging results will be communicated to you by MyChart, letter or phone within 4 business days after the clinic has received the results. If you do not hear from us within 7 days, please contact the clinic through MyChart or phone. If you have a critical or abnormal lab result, we will notify you by phone as soon as possible.  Submit refill requests through [employer] or call your pharmacy and they will forward the refill request to us. Please allow 3 business days for your refill to be completed.          Additional Information About Your Visit        Care EveryWhere ID     This is your Care EveryWhere ID. This could be used by other organizations to access your [place] medical records  [id number]        Your Vitals Were     Pulse Temperature Height Pulse Oximetry BMI (Body Mass Index)       78 98  F (36.7  C) (Oral) 5' 8.25\" (1.734 m) 99% 25.81 kg/m2        Blood Pressure from Last 3 Encounters:   07/20/18 115/75   07/13/18 104/69   05/23/18 119/79    Weight from Last 3 Encounters:   07/20/18 171 lb (77.6 kg)   07/13/18 173 lb (78.5 kg)   05/23/18 170 lb (77.1 kg)              We Performed the Following     Beta strep group A culture     Rapid strep screen          Today's Medication Changes          These changes are accurate as of 7/20/18  2:19 PM.  If you have any questions, ask your nurse or doctor.             "   Start taking these medicines.        Dose/Directions    fluticasone 50 MCG/ACT spray   Commonly known as:  FLONASE   Used for:  Throat pain   Started by:  Lise Mancilla PA-C        Dose:  1-2 spray   Spray 1-2 sprays into both nostrils daily   Quantity:  1 Bottle   Refills:  11            Where to get your medicines      These medications were sent to [facility]/pharmacy #[id number] - [place] [address] OF [address] [place] [address]     Phone:  [phone]     fluticasone 50 MCG/ACT spray                Primary Care Provider Office Phone # Fax #    [facility] [phone]       [address] [facility] [address]        Equal Access to Services     CHE MANCINI : Hadii aad ku hadasho Soomaali, waaxda luqadaha, qaybta kaalmada adeegyada, waxnuno vilchis. So [facility] [phone].    ATENCIÓN: Si habla español, tiene a polanco disposición servicios gratuitos de asistencia lingüística. [facility] [phone].    We comply with applicable federal civil rights laws and [place] laws. We do not discriminate on the basis of race, color, national origin, age, disability, sex, sexual orientation, or gender identity.            Thank you!     Thank you for choosing [facility]  for your care. Our goal is always to provide you with excellent care. Hearing back from our patients is one way we can continue to improve our services. Please take a few minutes to complete the written survey that you may receive in the mail after your visit with us. Thank you!             Your Updated Medication List - Protect others around you: Learn how to safely use, store and throw away your medicines at www.disposemymeds.org.          This list is accurate as of 7/20/18  2:19 PM.  Always use your most recent med list.                   Brand Name Dispense Instructions for use Diagnosis    clindamycin 1 % lotion     CLEOCIN-T    60 mL    Apply topically 2 times daily    Acne, unspecified acne type       fluticasone 50 MCG/ACT spray    FLONASE    1 Bottle    Spray 1-2 sprays into both nostrils daily    Throat pain       propranolol 80 MG 24 hr capsule    INDERAL LA    90 capsule    Take 1 capsule (80 mg) by mouth daily    Other migraine without status migrainosus, not intractable       sertraline 25 MG tablet    ZOLOFT    30 tablet    Take 1 tablet (25 mg) by mouth daily    Anxiety       SUMAtriptan 25 MG tablet    IMITREX    9 tablet    TAKE 1 TO 2 TABS BY MOUTH AT ONSET OF HEADACHE,MAY REPEAT IN2HRS,MAX 8 IN 24HRS    Other migraine without status migrainosus, not intractable

## 2018-07-20 NOTE — PROGRESS NOTES
"  SUBJECTIVE:   Yenny Ortiz is a 22 year old female who presents to clinic today for the following health issues:        ENT Symptoms             Symptoms: cc Present Absent Comment   Fever/Chills   x    Fatigue   x    Muscle Aches   x    Eye Irritation   x    Sneezing  x     Nasal Sergio/Drg   x    Sinus Pressure/Pain   x    Loss of smell   x    Dental pain   x    Sore Throat  x     Swollen Glands   x    Ear Pain/Fullness   x    Cough  x  Mild    Wheeze   x    Chest Pain   x    Shortness of breath   x    Rash   x    Other    Headache      Symptom duration:  3 days    Symptom severity:  moderate   Treatments tried:  cough drops, cold med OTC    Contacts:  sister had cold,no strep      Gets frequent sore throats.          Problem list and histories reviewed & adjusted, as indicated.  Additional history: as documented    Patient Active Problem List   Diagnosis     Contraception     Other migraine without status migrainosus, not intractable     Anxiety     Acne, unspecified acne type     Mild episode of recurrent major depressive disorder (H)     History reviewed. No pertinent surgical history.    Social History   Substance Use Topics     Smoking status: Never Smoker     Smokeless tobacco: Never Used     Alcohol use No     History reviewed. No pertinent family history.        Reviewed and updated as needed this visit by clinical staff  Tobacco  Allergies  Meds  Med Hx  Surg Hx  Fam Hx  Soc Hx      Reviewed and updated as needed this visit by Provider         ROS:  As above    OBJECTIVE:     /75  Pulse 78  Temp 98  F (36.7  C) (Oral)  Ht 5' 8.25\" (1.734 m)  Wt 171 lb (77.6 kg)  SpO2 99%  BMI 25.81 kg/m2  Body mass index is 25.81 kg/(m^2).  GENERAL: healthy, alert and no distress  HENT: ear canals and TM's normal, oropharynx clear, oral mucous membranes moist and tonsillar exudate  NECK: no adenopathy and no asymmetry, masses, or scars  RESP: lungs clear to auscultation - no rales, rhonchi or " wheezes  CV: regular rates and rhythm, normal S1 S2, no S3 or S4 and no murmur, click or rub    Diagnostic Test Results:  Results for orders placed or performed in visit on 07/20/18 (from the past 24 hour(s))   Rapid strep screen   Result Value Ref Range    Specimen Description Throat     Rapid Strep A Screen       NEGATIVE: No Group A streptococcal antigen detected by immunoassay, await culture report.       ASSESSMENT/PLAN:         ICD-10-CM    1. Throat pain R07.0 Rapid strep screen     Beta strep group A culture     fluticasone (FLONASE) 50 MCG/ACT spray     Continue symptomatic treatment.  return to clinic if not improving.      FUTURE APPOINTMENTS:       - Follow-up for annual visit or as needed    Lise Mancilla PA-C  [facility]

## 2018-07-21 LAB
BACTERIA SPEC CULT: NORMAL
SPECIMEN SOURCE: NORMAL

## 2018-12-13 ENCOUNTER — TELEPHONE (OUTPATIENT)
Dept: FAMILY MEDICINE | Facility: CLINIC | Age: 23
End: 2018-12-13

## 2018-12-13 DIAGNOSIS — F41.9 ANXIETY: ICD-10-CM

## 2018-12-13 NOTE — LETTER
12/20/2018     Yenny Ortiz  [address] [place] [address]  [facility] [address]      Dear Yenny:    We received a request to refill Sertraline from your pharmacy. The last request we had received was 8/24/2018 are you still taking this medication? Lise Mancilla asks that you schedule an appointment before we will okay a refill of the Sertraline.    Thank you,    Fabiola Uribe RN    [address] [address]  Phone: [phone]  Fax: [phone]

## 2018-12-13 NOTE — TELEPHONE ENCOUNTER
"Requested Prescriptions   Pending Prescriptions Disp Refills     sertraline (ZOLOFT) 25 MG tablet [Pharmacy Med Name: SERTRALINE HCL 25 MG TABLET] 30 tablet 0     Sig: TAKE 1 TABLET BY MOUTH EVERY DAY    SSRIs Protocol Passed - 12/13/2018  5:26 PM       Passed - Recent (12 mo) or future (30 days) visit within the authorizing provider's specialty    Patient had office visit in the last 12 months or has a visit in the next 30 days with authorizing provider or within the authorizing provider's specialty.  See \"Patient Info\" tab in inbasket, or \"Choose Columns\" in Meds & Orders section of the refill encounter.             Passed - Patient is age 18 or older       Passed - No active pregnancy on record       Passed - No positive pregnancy test in last 12 months        Last Rx sent was 30 tabs on 8/24/18??  Was advised she needed office visit at that time for more refills.    Nothing yet scheduled.  She has anxiety and depression on problem list.    PHQ-9 score:    PHQ-9 SCORE 7/13/2018   PHQ-9 Total Score 15       Attempted to call patient at home/mobile number, left message on voicemail; patient was instructed to return call to [facility] RN directly on the RN call back line at [phone]   Kiarra Page RN  [facility]              "

## 2018-12-14 NOTE — TELEPHONE ENCOUNTER
Attempt # 2  Called patient at home [phone]  Was call answered?  No answer, left message to call nurse line at [phone]    Fabiola Uribe RN  [facility]

## 2018-12-18 NOTE — TELEPHONE ENCOUNTER
Last Rx sent was 30 tabs on 8/24/18??  Was advised she needed office visit at that time for more refills.     Nothing yet scheduled.  She has anxiety and depression on problem list.     PHQ-9 score:    PHQ-9 SCORE 7/13/2018   PHQ-9 Total Score 15     Attempt # 3  Called patient at home [phone]  Was call answered?  No answer, left message to call nurse line at [phone]     Fabiola Uribe RN  [facility]

## 2018-12-19 RX ORDER — SERTRALINE HYDROCHLORIDE 25 MG/1
TABLET, FILM COATED ORAL
Qty: 30 TABLET | Refills: 0 | OUTPATIENT
Start: 2018-12-19

## 2018-12-19 NOTE — TELEPHONE ENCOUNTER
Attempt # 4  Called patient at home [phone]  Was call answered?  No answer, left message on voice mail identified as Yenny Ortiz's to call nurse line at [phone] regarding a request for a refill.    Nurse refused refill of Sertraline with note to pharmacist patient needs appointment.      Fabiola Uribe RN  [facility]

## 2018-12-20 NOTE — TELEPHONE ENCOUNTER
Sent letter to patient informing of need for appointment.    Fabiola Uribe RN  [facility]

## 2019-02-06 DIAGNOSIS — L70.9 ACNE, UNSPECIFIED ACNE TYPE: ICD-10-CM

## 2019-02-06 NOTE — TELEPHONE ENCOUNTER
"Requested Prescriptions   Pending Prescriptions Disp Refills     clindamycin (CLEOCIN T) 1 % external lotion [Pharmacy Med Name: CLINDAMYCIN PHOSP 1% LOTION] 60 mL 1     Sig: APPLY TO AFFECTED AREA TWICE A DAY    Topical Acne Medications Protocol Passed - 2/6/2019  3:38 PM       Passed - Patient is 12 years of age or older       Passed - Recent (12 mo) or future (30 days) visit within the authorizing provider's specialty    Patient had office visit in the last 12 months or has a visit in the next 30 days with authorizing provider or within the authorizing provider's specialty.  See \"Patient Info\" tab in inbasket, or \"Choose Columns\" in Meds & Orders section of the refill encounter.             Passed - Medication is active on med list        Last Written Prescription Date:  7/13/18  Last Fill Quantity: 60,  # refills: 1   Last office visit: 7/20/2018 with prescribing provider:  [facility]     Future Office Visit:      "

## 2019-02-07 RX ORDER — CLINDAMYCIN PHOSPHATE 10 UG/ML
LOTION TOPICAL
Qty: 60 ML | Refills: 1 | Status: SHIPPED | OUTPATIENT
Start: 2019-02-07 | End: 2019-05-17

## 2019-04-01 DIAGNOSIS — F41.9 ANXIETY: ICD-10-CM

## 2019-04-01 RX ORDER — SERTRALINE HYDROCHLORIDE 25 MG/1
TABLET, FILM COATED ORAL
Qty: 30 TABLET | Refills: 0 | Status: SHIPPED | OUTPATIENT
Start: 2019-04-01 | End: 2019-05-13

## 2019-04-01 NOTE — TELEPHONE ENCOUNTER
"Requested Prescriptions   Pending Prescriptions Disp Refills     sertraline (ZOLOFT) 25 MG tablet [Pharmacy Med Name: SERTRALINE HCL 25 MG TABLET] 30 tablet 0    Last Written Prescription Date:  8-24-18  Last Fill Quantity: 30,  # refills: 0   Last office visit: 7/20/2018 with prescribing provider:  7-20-18   Future Office Visit:     Sig: TAKE 1 TABLET BY MOUTH EVERY DAY    SSRIs Protocol Passed - 4/1/2019  1:25 PM       Passed - Recent (12 mo) or future (30 days) visit within the authorizing provider's specialty    Patient had office visit in the last 12 months or has a visit in the next 30 days with authorizing provider or within the authorizing provider's specialty.  See \"Patient Info\" tab in inbasket, or \"Choose Columns\" in Meds & Orders section of the refill encounter.             Passed - Medication is active on med list       Passed - Patient is age 18 or older       Passed - No active pregnancy on record       Passed - No positive pregnancy test in last 12 months          "

## 2019-05-13 ENCOUNTER — OFFICE VISIT (OUTPATIENT)
Dept: FAMILY MEDICINE | Facility: CLINIC | Age: 24
End: 2019-05-13
Payer: COMMERCIAL

## 2019-05-13 VITALS
DIASTOLIC BLOOD PRESSURE: 75 MMHG | HEART RATE: 100 BPM | WEIGHT: 170 LBS | OXYGEN SATURATION: 98 % | SYSTOLIC BLOOD PRESSURE: 139 MMHG | HEIGHT: 68 IN | TEMPERATURE: 98.4 F | BODY MASS INDEX: 25.76 KG/M2

## 2019-05-13 DIAGNOSIS — F33.0 MILD EPISODE OF RECURRENT MAJOR DEPRESSIVE DISORDER (H): Primary | ICD-10-CM

## 2019-05-13 DIAGNOSIS — Z83.3 FAMILY HISTORY OF DIABETES MELLITUS: ICD-10-CM

## 2019-05-13 DIAGNOSIS — G43.809 OTHER MIGRAINE WITHOUT STATUS MIGRAINOSUS, NOT INTRACTABLE: ICD-10-CM

## 2019-05-13 DIAGNOSIS — F41.9 ANXIETY: ICD-10-CM

## 2019-05-13 LAB — HBA1C MFR BLD: 5.2 % (ref 0–5.6)

## 2019-05-13 PROCEDURE — 83036 HEMOGLOBIN GLYCOSYLATED A1C: CPT | Performed by: PHYSICIAN ASSISTANT

## 2019-05-13 PROCEDURE — 99214 OFFICE O/P EST MOD 30 MIN: CPT | Performed by: PHYSICIAN ASSISTANT

## 2019-05-13 PROCEDURE — 36415 COLL VENOUS BLD VENIPUNCTURE: CPT | Performed by: PHYSICIAN ASSISTANT

## 2019-05-13 RX ORDER — SUMATRIPTAN 25 MG/1
TABLET, FILM COATED ORAL
Qty: 9 TABLET | Refills: 1 | Status: SHIPPED | OUTPATIENT
Start: 2019-05-13 | End: 2019-09-17

## 2019-05-13 RX ORDER — SERTRALINE HYDROCHLORIDE 25 MG/1
25 TABLET, FILM COATED ORAL DAILY
Qty: 30 TABLET | Refills: 1 | Status: SHIPPED | OUTPATIENT
Start: 2019-05-13 | End: 2020-01-30

## 2019-05-13 ASSESSMENT — ANXIETY QUESTIONNAIRES
GAD7 TOTAL SCORE: 11
3. WORRYING TOO MUCH ABOUT DIFFERENT THINGS: MORE THAN HALF THE DAYS
IF YOU CHECKED OFF ANY PROBLEMS ON THIS QUESTIONNAIRE, HOW DIFFICULT HAVE THESE PROBLEMS MADE IT FOR YOU TO DO YOUR WORK, TAKE CARE OF THINGS AT HOME, OR GET ALONG WITH OTHER PEOPLE: SOMEWHAT DIFFICULT
2. NOT BEING ABLE TO STOP OR CONTROL WORRYING: SEVERAL DAYS
1. FEELING NERVOUS, ANXIOUS, OR ON EDGE: MORE THAN HALF THE DAYS
5. BEING SO RESTLESS THAT IT IS HARD TO SIT STILL: SEVERAL DAYS
6. BECOMING EASILY ANNOYED OR IRRITABLE: MORE THAN HALF THE DAYS
7. FEELING AFRAID AS IF SOMETHING AWFUL MIGHT HAPPEN: MORE THAN HALF THE DAYS

## 2019-05-13 ASSESSMENT — PATIENT HEALTH QUESTIONNAIRE - PHQ9
5. POOR APPETITE OR OVEREATING: SEVERAL DAYS
SUM OF ALL RESPONSES TO PHQ QUESTIONS 1-9: 13

## 2019-05-13 ASSESSMENT — MIFFLIN-ST. JEOR: SCORE: 1588.58

## 2019-05-13 NOTE — PROGRESS NOTES
SUBJECTIVE:   Yenny Ortiz is a 23 year old female who presents to clinic today for the following   health issues:        Depression and Anxiety Cfbgmu-[id number]-[id number] out of Zoloft 2-3 months ago     Status since last visit: Worsened     Other associated symptoms:None    Complicating factors:     Significant life event: No     Current substance abuse: None    Has been out of zoloft because she ran out.  Felt better on it.  Did try it for a few months.  No side effects.        PHQ 7/13/2018 5/13/2019   PHQ-9 Total Score 15 13   Q9: Thoughts of better off dead/self-harm past 2 weeks Not at all Not at all     TJ-7 SCORE 7/13/2018 5/13/2019   Total Score 19 11       PHQ-9  English  PHQ-9   Any Language  TJ-7  Suicide Assessment Five-step Evaluation and Treatment (SAFE-T)  Migraine Follow-Up    Headaches symptoms:  Improved     Frequency: once every couple months      Duration of headaches: 2 hours     Able to do normal daily activities/work with migraines: No -     Rescue/Relief medication:sumatriptan (Imitrex)              Effectiveness: total relief    Preventative medication: inderal    Neurologic complications: No new stroke-like symptoms, loss of vision or speech, numbness or weakness    Does get some weakness on the left side during the headache    Blurry vision     In the past 4 weeks, how often have you gone to Urgent Care or the emergency room because of your headaches?  0      Amount of exercise or physical activity: None    Problems taking medications regularly: No    Medication side effects: none    Diet: regular (no restrictions)      Check for diabetes and family history stroke -wants to discuss  Dad side has had strokes.  All are diabetic.   Is thirst a lot.  Then has to urinate frequently.  No weight changes.        Additional history: as documented    Reviewed  and updated as needed this visit by clinical staff  Tobacco  Allergies  Meds         Reviewed and updated as needed this visit by  "Provider  Allergies  Meds         Patient Active Problem List   Diagnosis     Other migraine without status migrainosus, not intractable     Anxiety     Acne, unspecified acne type     Mild episode of recurrent major depressive disorder (H)     No past surgical history on file.    Social History     Tobacco Use     Smoking status: Never Smoker     Smokeless tobacco: Never Used   Substance Use Topics     Alcohol use: No     No family history on file.        ROS:  As above    OBJECTIVE:     /75   Pulse 100   Temp 98.4  F (36.9  C) (Oral)   Ht 1.734 m (5' 8.25\")   Wt 77.1 kg (170 lb)   LMP 05/12/2019   SpO2 98%   BMI 25.66 kg/m    Body mass index is 25.66 kg/m .  GENERAL: healthy, alert and no distress  RESP: lungs clear to auscultation - no rales, rhonchi or wheezes  CV: regular rates and rhythm, normal S1 S2, no S3 or S4 and no murmur, click or rub  NEURO: Normal strength and tone, mentation intact, cranial nerves 2-12 intact, DTR's normal and symmetric knees, Romberg normal and rapid alternating movements normal  PSYCH: mentation appears normal, affect normal/bright    Diagnostic Test Results:  none     ASSESSMENT/PLAN:       1. Mild episode of recurrent major depressive disorder (H)  Restart the zoloft.  Think about a therapist.      2. Anxiety  As above  - sertraline (ZOLOFT) 25 MG tablet; Take 1 tablet (25 mg) by mouth daily  Dispense: 30 tablet; Refill: 1    3. Family history of diabetes mellitus  Follow up as needed  - Hemoglobin A1c    4. Other migraine without status migrainosus, not intractable  Much improved.  Refilled Imitrex for as needed  - SUMAtriptan (IMITREX) 25 MG tablet; TAKE 1 TO 2 TABS BY MOUTH AT ONSET OF HEADACHE,MAY REPEAT IN2HRS,MAX 8 IN 24HRS  Dispense: 9 tablet; Refill: 1    FUTURE APPOINTMENTS:       - Follow-up visit in 3 weeks    Lise Mancilla PA-C  [facility]      "

## 2019-05-13 NOTE — LETTER
[facility]   [address] [place]  [address]  [phone]                                   May 20, 2019    Yenny Ortiz  [address] [place] [address] [address] [facility] [address]  [facility] [address]        Dear Yenny,    Your diabetes screen is negative.     Let me know if you have any questions    Results for orders placed or performed in visit on 05/13/19   Hemoglobin A1c   Result Value Ref Range    Hemoglobin A1C 5.2 0 - 5.6 %       If you have any questions please call the clinic at [phone]    Sincerely,    Lise Mancilla PA-C  [facility]

## 2019-05-14 ASSESSMENT — ANXIETY QUESTIONNAIRES: GAD7 TOTAL SCORE: 11

## 2019-05-17 DIAGNOSIS — L70.9 ACNE, UNSPECIFIED ACNE TYPE: ICD-10-CM

## 2019-05-17 NOTE — TELEPHONE ENCOUNTER
"Requested Prescriptions   Pending Prescriptions Disp Refills     clindamycin (CLEOCIN T) 1 % external lotion [Pharmacy Med Name: CLINDAMYCIN PHOSP 1% LOTION] 60 mL 1     Sig: APPLY TO AFFECTED AREA TWICE A DAY   Last Written Prescription Date:  2-7-19  Last Fill Quantity: 60ml,  # refills: 1   Last office visit: 5/13/2019 with prescribing provider:  5-13-19   Future Office Visit:        Topical Acne Medications Protocol Passed - 5/17/2019 11:48 AM        Passed - Patient is 12 years of age or older        Passed - Recent (12 mo) or future (30 days) visit within the authorizing provider's specialty     Patient had office visit in the last 12 months or has a visit in the next 30 days with authorizing provider or within the authorizing provider's specialty.  See \"Patient Info\" tab in inbasket, or \"Choose Columns\" in Meds & Orders section of the refill encounter.              Passed - Medication is active on med list          "

## 2019-05-21 RX ORDER — CLINDAMYCIN PHOSPHATE 10 UG/ML
LOTION TOPICAL
Qty: 60 ML | Refills: 1 | Status: SHIPPED | OUTPATIENT
Start: 2019-05-21 | End: 2020-01-30

## 2019-07-11 ENCOUNTER — TELEPHONE (OUTPATIENT)
Dept: FAMILY MEDICINE | Facility: CLINIC | Age: 24
End: 2019-07-11

## 2019-07-11 NOTE — LETTER
July 23, 2019    Yenny Ortiz  [address]  [facility] [address]      Dear Yenny [place] Ortiz,     We have tried to contact you about your health, but have been unable to reach you.  Please call us as soon as possible so we can provide you with the best care possible.  We will continue to check in with you throughout the year to complete these items of care, if you are not able to complete these items at this time.  If you would like to complete the missing items for your care, please contact us at [phone].    We recommend the following:  -schedule a PAP SMEAR EXAM which is due.  Please disregard this reminder if you have had this exam elsewhere within the last year.  It would be helpful for us to have a copy of your recent pap smear report in our file so that we can best coordinate your care.        Sincerely,     Your Care Team at [place]

## 2019-07-11 NOTE — TELEPHONE ENCOUNTER
Panel Management Review      Patient has the following on her problem list:     Depression / Dysthymia review    Measure:  Needs PHQ-9 score of 4 or less during index window.  Administer PHQ-9 and if score is 5 or more, send encounter to provider for next steps.    5 - 7 month window range:     PHQ-9 SCORE 7/13/2018 5/13/2019   PHQ-9 Total Score 15 13       If PHQ-9 recheck is 5 or more, route to provider for next steps.    Patient is due for:  DAP      Composite cancer screening  Chart review shows that this patient is due/due soon for the following Pap Smear  Summary:    Patient is due/failing the following:   DAP and PAP    Action needed:   Patient needs office visit for physical with pap smear .    Type of outreach:    Sent letter.    Questions for provider review:    None                                                                                                                                         Chart routed to Care Team .

## 2019-07-11 NOTE — LETTER
July 11, 2019    Yenny Ortiz  [address] [place] [address] [address]  [facility] [address]    Dear Yenny    We care about your health and have reviewed your health plan. We have reviewed your medical conditions, medication list, and lab results and are making recommendations based on this review, to better manage your health.    You are in particular need of attention regarding:  - Scheduling a Physical with a Cervical Cancer Screening (Pap Smear) age 64 and younger [phone]      Here is a list of Health Maintenance topics that are due now or due soon:  Health Maintenance Due   Topic Date Due     PREVENTIVE CARE VISIT  1997     DEPRESSION ACTION PLAN  1997     PAP  1997     DTAP/TDAP/TD IMMUNIZATION (2 - Td) 09/15/2009     HPV IMMUNIZATION (1 - Female 3-dose series) 11/21/2012     CHLAMYDIA SCREENING  10/10/2018     We will be calling you in the next couple of weeks to help you schedule any appointments that are needed.  Please call us at [phone] (or use [employer]) to address the above recommendations.     Thank you for trusting [facility] and we appreciate the opportunity to serve you.  We look forward to supporting your healthcare needs in the future.    Healthy Regards,    Luz Marina Mancilla

## 2019-09-17 DIAGNOSIS — G43.809 OTHER MIGRAINE WITHOUT STATUS MIGRAINOSUS, NOT INTRACTABLE: ICD-10-CM

## 2019-09-17 NOTE — TELEPHONE ENCOUNTER
"Requested Prescriptions   Pending Prescriptions Disp Refills     SUMAtriptan (IMITREX) 25 MG tablet [Pharmacy Med Name: SUMATRIPTAN SUCC 25 MG TABLET] 9 tablet 1     Sig: TAKE 1 TO 2 TABS BY MOUTH AT ONSET OF HEADACHE,MAY REPEAT IN2HRS,MAX 8 IN 24HRS   Last Written Prescription Date:  5/13/19  Last Fill Quantity: 9,  # refills: 1   Last office visit: 5/13/2019 with prescribing provider:     Future Office Visit:        Serotonin Agonists Failed - 9/17/2019  2:23 PM        Failed - Serotonin Agonist request needs review.     Please review patient's record. If patient has had 8 or more treatments in the past month, please forward to provider.          Passed - Blood pressure under 140/90 in past 12 months     BP Readings from Last 3 Encounters:   05/13/19 139/75   07/20/18 115/75   07/13/18 104/69                 Passed - Recent (12 mo) or future (30 days) visit within the authorizing provider's specialty     Patient had office visit in the last 12 months or has a visit in the next 30 days with authorizing provider or within the authorizing provider's specialty.  See \"Patient Info\" tab in inbasket, or \"Choose Columns\" in Meds & Orders section of the refill encounter.              Passed - Medication is active on med list        Passed - Patient is age 18 or older        Passed - No active pregnancy on record        Passed - No positive pregnancy test in past 12 months          "

## 2019-09-19 RX ORDER — SUMATRIPTAN 25 MG/1
TABLET, FILM COATED ORAL
Qty: 9 TABLET | Refills: 3 | Status: SHIPPED | OUTPATIENT
Start: 2019-09-19 | End: 2020-01-30

## 2019-11-21 DIAGNOSIS — G43.809 OTHER MIGRAINE WITHOUT STATUS MIGRAINOSUS, NOT INTRACTABLE: ICD-10-CM

## 2019-11-21 NOTE — TELEPHONE ENCOUNTER
"Requested Prescriptions   Pending Prescriptions Disp Refills     propranolol ER (INDERAL LA) 80 MG 24 hr capsule [Pharmacy Med Name: PROPRANOLOL ER 80 MG CAPSULE] 30 capsule 5     Sig: TAKE 1 CAPSULE (80 MG) BY MOUTH DAILY   Last Written Prescription Date:   7-3-19  Last Fill Quantity: 90,  # refills: 1   Last office visit: 5/13/2019 with prescribing provider:  5-13-19   Future Office Visit:        Beta-Blockers Protocol Passed - 11/21/2019  9:17 AM        Passed - Blood pressure under 140/90 in past 12 months     BP Readings from Last 3 Encounters:   05/13/19 139/75   07/20/18 115/75   07/13/18 104/69                 Passed - Patient is age 6 or older        Passed - Recent (12 mo) or future (30 days) visit within the authorizing provider's specialty     Patient has had an office visit with the authorizing provider or a provider within the authorizing providers department within the previous 12 mos or has a future within next 30 days. See \"Patient Info\" tab in inbasket, or \"Choose Columns\" in Meds & Orders section of the refill encounter.              Passed - Medication is active on med list          "

## 2019-11-22 RX ORDER — PROPRANOLOL HYDROCHLORIDE 80 MG/1
80 CAPSULE, EXTENDED RELEASE ORAL DAILY
Qty: 30 CAPSULE | Refills: 1 | Status: SHIPPED | OUTPATIENT
Start: 2019-11-22 | End: 2020-01-30

## 2019-11-22 NOTE — TELEPHONE ENCOUNTER
Prescription approved per [facility] Refill Protocol.    Melisa Huerta, RN, BSN, PHN  [facility]: [place]

## 2020-01-30 ENCOUNTER — OFFICE VISIT (OUTPATIENT)
Dept: FAMILY MEDICINE | Facility: CLINIC | Age: 25
End: 2020-01-30
Payer: COMMERCIAL

## 2020-01-30 VITALS
HEART RATE: 84 BPM | WEIGHT: 177 LBS | TEMPERATURE: 98 F | SYSTOLIC BLOOD PRESSURE: 116 MMHG | HEIGHT: 68 IN | BODY MASS INDEX: 26.83 KG/M2 | DIASTOLIC BLOOD PRESSURE: 66 MMHG

## 2020-01-30 DIAGNOSIS — L70.9 ACNE, UNSPECIFIED ACNE TYPE: ICD-10-CM

## 2020-01-30 DIAGNOSIS — G43.809 OTHER MIGRAINE WITHOUT STATUS MIGRAINOSUS, NOT INTRACTABLE: Primary | ICD-10-CM

## 2020-01-30 DIAGNOSIS — F41.9 ANXIETY: ICD-10-CM

## 2020-01-30 PROCEDURE — 99214 OFFICE O/P EST MOD 30 MIN: CPT | Performed by: PHYSICIAN ASSISTANT

## 2020-01-30 RX ORDER — PROPRANOLOL HYDROCHLORIDE 80 MG/1
80 CAPSULE, EXTENDED RELEASE ORAL DAILY
Qty: 90 CAPSULE | Refills: 3 | Status: SHIPPED | OUTPATIENT
Start: 2020-01-30 | End: 2021-02-24

## 2020-01-30 RX ORDER — SUMATRIPTAN 25 MG/1
25-50 TABLET, FILM COATED ORAL
Qty: 9 TABLET | Refills: 3 | Status: SHIPPED | OUTPATIENT
Start: 2020-01-30 | End: 2021-02-24

## 2020-01-30 RX ORDER — CLINDAMYCIN PHOSPHATE 10 UG/ML
LOTION TOPICAL 2 TIMES DAILY
Qty: 60 ML | Refills: 5 | Status: SHIPPED | OUTPATIENT
Start: 2020-01-30 | End: 2020-10-07

## 2020-01-30 RX ORDER — SERTRALINE HYDROCHLORIDE 25 MG/1
25 TABLET, FILM COATED ORAL DAILY
Qty: 90 TABLET | Refills: 1 | Status: SHIPPED | OUTPATIENT
Start: 2020-01-30 | End: 2020-08-07

## 2020-01-30 ASSESSMENT — ANXIETY QUESTIONNAIRES
5. BEING SO RESTLESS THAT IT IS HARD TO SIT STILL: SEVERAL DAYS
6. BECOMING EASILY ANNOYED OR IRRITABLE: SEVERAL DAYS
2. NOT BEING ABLE TO STOP OR CONTROL WORRYING: MORE THAN HALF THE DAYS
7. FEELING AFRAID AS IF SOMETHING AWFUL MIGHT HAPPEN: SEVERAL DAYS
3. WORRYING TOO MUCH ABOUT DIFFERENT THINGS: MORE THAN HALF THE DAYS
GAD7 TOTAL SCORE: 9
1. FEELING NERVOUS, ANXIOUS, OR ON EDGE: SEVERAL DAYS
IF YOU CHECKED OFF ANY PROBLEMS ON THIS QUESTIONNAIRE, HOW DIFFICULT HAVE THESE PROBLEMS MADE IT FOR YOU TO DO YOUR WORK, TAKE CARE OF THINGS AT HOME, OR GET ALONG WITH OTHER PEOPLE: SOMEWHAT DIFFICULT

## 2020-01-30 ASSESSMENT — PATIENT HEALTH QUESTIONNAIRE - PHQ9
5. POOR APPETITE OR OVEREATING: SEVERAL DAYS
SUM OF ALL RESPONSES TO PHQ QUESTIONS 1-9: 12

## 2020-01-30 ASSESSMENT — MIFFLIN-ST. JEOR: SCORE: 1613.12

## 2020-01-30 NOTE — LETTER
[facility]  [address] [facility] [address]  [phone]          January 30, 2020    RE:  Yenny Ortiz                                                                                                                                                       [address] [facility] [address]    [facility] [address]            To whom it may concern:     Please excuse Yenny from work today for an acute issue.     Sincerely,        Max Ken

## 2020-01-31 ASSESSMENT — ANXIETY QUESTIONNAIRES: GAD7 TOTAL SCORE: 9

## 2020-02-18 ENCOUNTER — OFFICE VISIT (OUTPATIENT)
Dept: FAMILY MEDICINE | Facility: CLINIC | Age: 25
End: 2020-02-18
Payer: COMMERCIAL

## 2020-02-18 VITALS
SYSTOLIC BLOOD PRESSURE: 116 MMHG | HEART RATE: 83 BPM | TEMPERATURE: 98.2 F | BODY MASS INDEX: 26.83 KG/M2 | WEIGHT: 177 LBS | DIASTOLIC BLOOD PRESSURE: 75 MMHG | OXYGEN SATURATION: 98 %

## 2020-02-18 DIAGNOSIS — Z11.3 SCREEN FOR STD (SEXUALLY TRANSMITTED DISEASE): Primary | ICD-10-CM

## 2020-02-18 PROCEDURE — 86803 HEPATITIS C AB TEST: CPT | Performed by: PHYSICIAN ASSISTANT

## 2020-02-18 PROCEDURE — 87591 N.GONORRHOEAE DNA AMP PROB: CPT | Performed by: PHYSICIAN ASSISTANT

## 2020-02-18 PROCEDURE — 87491 CHLMYD TRACH DNA AMP PROBE: CPT | Performed by: PHYSICIAN ASSISTANT

## 2020-02-18 PROCEDURE — 99213 OFFICE O/P EST LOW 20 MIN: CPT | Performed by: PHYSICIAN ASSISTANT

## 2020-02-18 PROCEDURE — 86780 TREPONEMA PALLIDUM: CPT | Performed by: PHYSICIAN ASSISTANT

## 2020-02-18 PROCEDURE — 36415 COLL VENOUS BLD VENIPUNCTURE: CPT | Performed by: PHYSICIAN ASSISTANT

## 2020-02-18 PROCEDURE — 87389 HIV-1 AG W/HIV-1&-2 AB AG IA: CPT | Performed by: PHYSICIAN ASSISTANT

## 2020-02-18 NOTE — PROGRESS NOTES
Subjective     Yenny Ortiz is a 24 year old female who presents to clinic today for the following health issues:    HPI   STD check   No symptoms.    Has had 2 partners since Dec.   Bleeding has been abnormal.  Having cramping like she is going to get her period again.  Feb period weird.    Took 2 home tests and both negative.  She declines pap smear today and is not interested in birth control.             Patient Active Problem List   Diagnosis     Other migraine without status migrainosus, not intractable     Anxiety     Acne, unspecified acne type     Mild episode of recurrent major depressive disorder (H)     History reviewed. No pertinent surgical history.    Social History     Tobacco Use     Smoking status: Never Smoker     Smokeless tobacco: Never Used   Substance Use Topics     Alcohol use: No     History reviewed. No pertinent family history.          Reviewed and updated as needed this visit by Provider  Allergies  Meds         Review of Systems   As above      Objective    /75   Pulse 83   Temp 98.2  F (36.8  C) (Oral)   Wt 80.3 kg (177 lb)   LMP 02/07/2020   SpO2 98%   BMI 26.83 kg/m    Body mass index is 26.83 kg/m .  Physical Exam  Constitutional:       General: She is not in acute distress.  Cardiovascular:      Rate and Rhythm: Normal rate and regular rhythm.   Pulmonary:      Effort: Pulmonary effort is normal.      Breath sounds: Normal breath sounds.   Abdominal:      General: Bowel sounds are normal.      Tenderness: There is no abdominal tenderness.   Neurological:      Mental Status: She is alert.   Psychiatric:         Mood and Affect: Mood normal.            Diagnostic Test Results:  Labs reviewed in Epic        Assessment & Plan     1. Screen for STD (sexually transmitted disease)  Discussed need for hpv, pap smear.  Encouraged birth control and condom use.    - Chlamydia trachomatis PCR  - Neisseria gonorrhoeae PCR  - HIV Antigen Antibody Combo  - Hepatitis C  "antibody  - Treponema Abs w Reflex to RPR and Titer     BMI:   Estimated body mass index is 26.83 kg/m  as calculated from the following:    Height as of 1/30/20: 1.73 m (5' 8.11\").    Weight as of this encounter: 80.3 kg (177 lb).               Return in about 3 months (around 5/18/2020) for Physical Exam.    Lise Mancilla PA-C  [facility]      "

## 2020-02-18 NOTE — LETTER
[facility]   [address] [place]  [address]  [phone]                                   February 20, 2020    Yenny Ortiz  [address] [facility] [address] [facility] [address]  [facility] [address]        Dear Yenny,    STD screening was negative.    Results for orders placed or performed in visit on 02/18/20   HIV Antigen Antibody Combo     Status: None   Result Value Ref Range    HIV Antigen Antibody Combo Nonreactive NR^Nonreactive       Hepatitis C antibody     Status: None   Result Value Ref Range    Hepatitis C Antibody Nonreactive NR^Nonreactive   Treponema Abs w Reflex to RPR and Titer     Status: None   Result Value Ref Range    Treponema Antibodies Nonreactive NR^Nonreactive   Chlamydia trachomatis PCR     Status: None   Result Value Ref Range    Specimen Description Urine     Chlamydia Trachomatis PCR Negative NEG^Negative   Neisseria gonorrhoeae PCR     Status: None   Result Value Ref Range    Specimen Descrip Urine     N Gonorrhea PCR Negative NEG^Negative       If you have any questions please call the clinic at [phone]    Sincerely,    Lise Mancilla PA-C  bmd

## 2020-02-19 LAB
C TRACH DNA SPEC QL NAA+PROBE: NEGATIVE
HCV AB SERPL QL IA: NONREACTIVE
HIV 1+2 AB+HIV1 P24 AG SERPL QL IA: NONREACTIVE
N GONORRHOEA DNA SPEC QL NAA+PROBE: NEGATIVE
SPECIMEN SOURCE: NORMAL
SPECIMEN SOURCE: NORMAL
T PALLIDUM AB SER QL: NONREACTIVE

## 2020-08-07 ENCOUNTER — OFFICE VISIT (OUTPATIENT)
Dept: FAMILY MEDICINE | Facility: CLINIC | Age: 25
End: 2020-08-07
Payer: COMMERCIAL

## 2020-08-07 VITALS
BODY MASS INDEX: 27.8 KG/M2 | WEIGHT: 183.4 LBS | DIASTOLIC BLOOD PRESSURE: 79 MMHG | TEMPERATURE: 98.2 F | SYSTOLIC BLOOD PRESSURE: 120 MMHG | HEART RATE: 96 BPM

## 2020-08-07 DIAGNOSIS — F41.9 ANXIETY: ICD-10-CM

## 2020-08-07 DIAGNOSIS — N89.8 VAGINAL DISCHARGE: Primary | ICD-10-CM

## 2020-08-07 DIAGNOSIS — F33.0 MILD EPISODE OF RECURRENT MAJOR DEPRESSIVE DISORDER (H): ICD-10-CM

## 2020-08-07 DIAGNOSIS — R30.9 PAINFUL URINATION: ICD-10-CM

## 2020-08-07 LAB
ALBUMIN UR-MCNC: NEGATIVE MG/DL
APPEARANCE UR: CLEAR
BACTERIA #/AREA URNS HPF: ABNORMAL /HPF
BILIRUB UR QL STRIP: NEGATIVE
COLOR UR AUTO: YELLOW
GLUCOSE UR STRIP-MCNC: NEGATIVE MG/DL
HGB UR QL STRIP: NEGATIVE
KETONES UR STRIP-MCNC: NEGATIVE MG/DL
LEUKOCYTE ESTERASE UR QL STRIP: ABNORMAL
NITRATE UR QL: NEGATIVE
NON-SQ EPI CELLS #/AREA URNS LPF: ABNORMAL /LPF
PH UR STRIP: 7.5 PH (ref 5–7)
RBC #/AREA URNS AUTO: ABNORMAL /HPF
SOURCE: ABNORMAL
SP GR UR STRIP: 1.02 (ref 1–1.03)
SPECIMEN SOURCE: NORMAL
UROBILINOGEN UR STRIP-ACNC: 0.2 EU/DL (ref 0.2–1)
WBC #/AREA URNS AUTO: ABNORMAL /HPF
WET PREP SPEC: NORMAL

## 2020-08-07 PROCEDURE — 87086 URINE CULTURE/COLONY COUNT: CPT | Performed by: FAMILY MEDICINE

## 2020-08-07 PROCEDURE — 81001 URINALYSIS AUTO W/SCOPE: CPT | Performed by: FAMILY MEDICINE

## 2020-08-07 PROCEDURE — 99214 OFFICE O/P EST MOD 30 MIN: CPT | Performed by: FAMILY MEDICINE

## 2020-08-07 PROCEDURE — 87210 SMEAR WET MOUNT SALINE/INK: CPT | Performed by: FAMILY MEDICINE

## 2020-08-07 RX ORDER — FLUCONAZOLE 150 MG/1
150 TABLET ORAL ONCE
Qty: 1 TABLET | Refills: 0 | Status: SHIPPED | OUTPATIENT
Start: 2020-08-07 | End: 2020-08-07

## 2020-08-07 RX ORDER — SULFAMETHOXAZOLE/TRIMETHOPRIM 800-160 MG
1 TABLET ORAL 2 TIMES DAILY
Qty: 6 TABLET | Refills: 0 | Status: SHIPPED | OUTPATIENT
Start: 2020-08-07 | End: 2020-10-07

## 2020-08-07 ASSESSMENT — PATIENT HEALTH QUESTIONNAIRE - PHQ9: SUM OF ALL RESPONSES TO PHQ QUESTIONS 1-9: 12

## 2020-08-07 NOTE — PROGRESS NOTES
Subjective     Yenny Ortiz is a 24 year old female who presents to clinic today for the following health issues:    HPI       Vaginal Symptoms  Onset: x1 week ago or so and Tuesday when symptoms started getting bad     Description:  Vaginal Discharge: curd-like   Itching (Pruritis): YES  Burning sensation:  YES  Odor: no     Accompanying Signs & Symptoms:  Pain with Urination: YES- sometimes  Abdominal Pain: no   Fever: no     History:   Sexually active: YES  New Partner: YES  Possibility of Pregnancy:  No    Precipitating factors:   Recent Antibiotic Use: no     Alleviating factors:      Therapies Tried and outcome: OTC Monistat and Azo       She is having some urinary frequency and urgency and dysuria.  She also has some burning sensation in the vaginal area when she is not urinating.  She has a vaginal discharge as noted above.  She has a history of anxiety and depression.  She had been on sertraline for that, but is no longer taking it because she did not feel like it was working well.  She is on propranolol for migraine prevention and also for anxiety to some extent.    Patient Active Problem List   Diagnosis     Other migraine without status migrainosus, not intractable     Anxiety     Acne, unspecified acne type     Mild episode of recurrent major depressive disorder (H)     History reviewed. No pertinent surgical history.    Social History     Tobacco Use     Smoking status: Never Smoker     Smokeless tobacco: Never Used   Substance Use Topics     Alcohol use: No     History reviewed. No pertinent family history.      Current Outpatient Medications   Medication Sig Dispense Refill       0     propranolol ER (INDERAL LA) 80 MG 24 hr capsule Take 1 capsule (80 mg) by mouth daily 90 capsule 3       0     SUMAtriptan (IMITREX) 25 MG tablet Take 1-2 tablets (25-50 mg) by mouth at onset of headache for migraine 9 tablet 3     clindamycin (CLEOCIN T) 1 % external lotion Apply topically 2 times daily  (Patient not taking: Reported on 8/7/2020) 60 mL 5     Allergies   Allergen Reactions     Amoxicillin        Reviewed and updated as needed this visit by Provider         Review of Systems   Constitutional, HEENT, cardiovascular, pulmonary, gi and gu systems are negative, except as otherwise noted.      Objective    /79 (BP Location: Left arm, Patient Position: Chair, Cuff Size: Adult Regular)   Pulse 96   Temp 98.2  F (36.8  C) (Oral)   Wt 83.2 kg (183 lb 6.4 oz)   LMP 07/31/2020   Breastfeeding No   BMI 27.80 kg/m    Body mass index is 27.8 kg/m .  Physical Exam   GENERAL: healthy, alert and no distress  PSYCH: mentation appears normal, affect normal/bright.  She scored a 12 on her PHQ 9 and a 10 on the TJ 7.    We had her do a self collect specimen for a wet prep and urinalysis.    Diagnostic Test Results:  Labs reviewed in Epic  Office Visit on 08/07/2020   Component Date Value Ref Range Status     Specimen Description 08/07/2020 Vagina   Final     Wet Prep 08/07/2020 No Trichomonas seen   Final     Wet Prep 08/07/2020 No clue cells seen   Final     Wet Prep 08/07/2020 No yeast seen   Final     Wet Prep 08/07/2020    Final                    Value:Moderate  WBC'S seen       Color Urine 08/07/2020 Yellow   Final     Appearance Urine 08/07/2020 Clear   Final     Glucose Urine 08/07/2020 Negative  NEG^Negative mg/dL Final     Bilirubin Urine 08/07/2020 Negative  NEG^Negative Final     Ketones Urine 08/07/2020 Negative  NEG^Negative mg/dL Final     Specific Gravity Urine 08/07/2020 1.020  1.003 - 1.035 Final     Blood Urine 08/07/2020 Negative  NEG^Negative Final     pH Urine 08/07/2020 7.5* 5.0 - 7.0 pH Final     Protein Albumin Urine 08/07/2020 Negative  NEG^Negative mg/dL Final     Urobilinogen Urine 08/07/2020 0.2  0.2 - 1.0 EU/dL Final     Nitrite Urine 08/07/2020 Negative  NEG^Negative Final     Leukocyte Esterase Urine 08/07/2020 Small* NEG^Negative Final     Source 08/07/2020 Midstream Urine    "Final     WBC Urine 08/07/2020 5-10* OTO5^0 - 5 /HPF Final     RBC Urine 08/07/2020 O - 2  OTO2^O - 2 /HPF Final     Squamous Epithelial /LPF Urine 08/07/2020 Few  FEW^Few /LPF Final     Bacteria Urine 08/07/2020 Few* NEG^Negative /HPF Final             Assessment & Plan       ICD-10-CM    1. Vaginal discharge  N89.8 Wet prep     Urine Microscopic     fluconazole (DIFLUCAN) 150 MG tablet   2. Painful urination  R30.9 *UA reflex to Microscopic and Culture ([place] and [facility] (except [place] and [place])     Urine Culture Aerobic Bacterial     sulfamethoxazole-trimethoprim (BACTRIM DS) 800-160 MG tablet   3. Mild episode of recurrent major depressive disorder (H)  F33.0    4. Anxiety  F41.9         BMI:   Estimated body mass index is 27.8 kg/m  as calculated from the following:    Height as of 1/30/20: 1.73 m (5' 8.11\").    Weight as of this encounter: 83.2 kg (183 lb 6.4 oz).     She has a few white cells and bacteria in the urine, but her wet prep looks negative  I am not sure of the accuracy of her wet prep specimen, however, so we will go ahead and treat her empirically for both a urinary tract infection and a vaginal yeast infection as above  She feels like she is doing okay with her anxiety and depression currently  She will continue with propranolol daily    She was also given a note for work today at her request    Return for a recheck if symptoms worsen or fail to improve.    Zaid Kumar MD  [facility]  "

## 2020-08-07 NOTE — LETTER
[address] [address]  Phone: [phone]  Fax: [phone]    August 7, 2020        Yenny Ortiz  [address] [place] [address] [address]  [facility] [address]          To whom it may concern:    RE: Yenny Ortiz    The above individual was seen in our office today and is missing work today because of health reasons.    Thank you for your consideration.      Sincerely,      .  Zaid Kumar MD

## 2020-08-08 LAB
BACTERIA SPEC CULT: NO GROWTH
Lab: NORMAL
SPECIMEN SOURCE: NORMAL

## 2020-10-07 ENCOUNTER — OFFICE VISIT (OUTPATIENT)
Dept: FAMILY MEDICINE | Facility: CLINIC | Age: 25
End: 2020-10-07
Payer: COMMERCIAL

## 2020-10-07 VITALS
WEIGHT: 183 LBS | OXYGEN SATURATION: 98 % | BODY MASS INDEX: 27.74 KG/M2 | DIASTOLIC BLOOD PRESSURE: 76 MMHG | HEART RATE: 88 BPM | SYSTOLIC BLOOD PRESSURE: 124 MMHG | TEMPERATURE: 98.9 F

## 2020-10-07 DIAGNOSIS — L70.0 ACNE VULGARIS: Primary | ICD-10-CM

## 2020-10-07 DIAGNOSIS — Z12.4 SCREENING FOR MALIGNANT NEOPLASM OF CERVIX: ICD-10-CM

## 2020-10-07 PROCEDURE — G0124 SCREEN C/V THIN LAYER BY MD: HCPCS | Performed by: PATHOLOGY

## 2020-10-07 PROCEDURE — G0145 SCR C/V CYTO,THINLAYER,RESCR: HCPCS | Performed by: PHYSICIAN ASSISTANT

## 2020-10-07 PROCEDURE — 99213 OFFICE O/P EST LOW 20 MIN: CPT | Performed by: PHYSICIAN ASSISTANT

## 2020-10-07 RX ORDER — DOXYCYCLINE HYCLATE 50 MG/1
50 CAPSULE ORAL 2 TIMES DAILY
Qty: 60 CAPSULE | Refills: 3 | Status: SHIPPED | OUTPATIENT
Start: 2020-10-07 | End: 2021-02-24

## 2020-10-07 RX ORDER — ADAPALENE 0.1 G/100G
CREAM TOPICAL
Qty: 45 G | Refills: 1 | Status: SHIPPED | OUTPATIENT
Start: 2020-10-07 | End: 2021-02-24

## 2020-10-07 NOTE — LETTER
October 15, 2020      Yenny Ortiz  [address]  [facility] [address]        Dear ,    This letter is regarding your recent Pap smear.    Your results showed Atypical Squamous Cells of Undetermined Significance (ASCUS).    It is recommended that you have your next Pap smear in 1 year.    If you have additional questions regarding this result, please contact our Registered Nurse, Sabrina, at [phone].      Sincerely,    Your [facility] Team

## 2020-10-07 NOTE — PROGRESS NOTES
"Subjective     Yenny Ortiz is a 24 year old female who presents to clinic today for the following health issues:    HPI          Acne discussion   She currently uses Cedifil for face wash and cereve for lotion.  She also puts on hydrogen peroxide but doesn't noticed any improvement.       clinidamycin helped for a short time but stopped working   Has not used any over the counter acne medication  The lesions are on her face, sometimes back.  They are painful at times.      Not interested in birth control         Review of Systems   As above      Objective    /76   Pulse 88   Temp 98.9  F (37.2  C) (Tympanic)   Wt 83 kg (183 lb)   SpO2 98%   BMI 27.74 kg/m    Body mass index is 27.74 kg/m .  Physical Exam  Constitutional:       General: She is not in acute distress.  Genitourinary:     Vagina: Normal.      Cervix: Normal.   Skin:     Findings: Acne (papule on face) present.   Neurological:      Mental Status: She is alert.                    Assessment & Plan     Acne vulgaris  Will try oral antibiotics .  Continue topical treatment and face wash.  If not improving in 6-8 weeks consider derm referral.    - doxycycline hyclate (VIBRAMYCIN) 50 MG capsule; Take 1 capsule (50 mg) by mouth 2 times daily  - adapalene (DIFFERIN) 0.1 % external cream; Apply to face once daily    Screening for malignant neoplasm of cervix  Follow up as needed  - Pap imaged thin layer screen only - recommended age 21 - 24 years     BMI:   Estimated body mass index is 27.74 kg/m  as calculated from the following:    Height as of 1/30/20: 1.73 m (5' 8.11\").    Weight as of this encounter: 83 kg (183 lb).                Return in about 6 weeks (around 11/18/2020) for if not improving.    Lise Mancilla PA-C  [facility]      "

## 2020-10-14 ENCOUNTER — PATIENT OUTREACH (OUTPATIENT)
Dept: FAMILY MEDICINE | Facility: CLINIC | Age: 25
End: 2020-10-14

## 2020-10-14 DIAGNOSIS — R87.610 ATYPICAL SQUAMOUS CELLS OF UNDETERMINED SIGNIFICANCE (ASCUS) ON PAPANICOLAOU SMEAR OF CERVIX: ICD-10-CM

## 2020-10-14 LAB
COPATH REPORT: ABNORMAL
PAP: ABNORMAL

## 2020-11-24 ENCOUNTER — OFFICE VISIT (OUTPATIENT)
Dept: FAMILY MEDICINE | Facility: CLINIC | Age: 25
End: 2020-11-24
Payer: COMMERCIAL

## 2020-11-24 VITALS
BODY MASS INDEX: 27.28 KG/M2 | HEART RATE: 99 BPM | WEIGHT: 180 LBS | DIASTOLIC BLOOD PRESSURE: 76 MMHG | TEMPERATURE: 98.6 F | SYSTOLIC BLOOD PRESSURE: 118 MMHG

## 2020-11-24 DIAGNOSIS — R35.0 URINE FREQUENCY: Primary | ICD-10-CM

## 2020-11-24 DIAGNOSIS — N94.89 VAGINAL BURNING: ICD-10-CM

## 2020-11-24 LAB
ALBUMIN UR-MCNC: NEGATIVE MG/DL
APPEARANCE UR: CLEAR
BACTERIA #/AREA URNS HPF: ABNORMAL /HPF
BILIRUB UR QL STRIP: NEGATIVE
COLOR UR AUTO: YELLOW
GLUCOSE UR STRIP-MCNC: NEGATIVE MG/DL
HGB UR QL STRIP: NEGATIVE
KETONES UR STRIP-MCNC: NEGATIVE MG/DL
LEUKOCYTE ESTERASE UR QL STRIP: ABNORMAL
NITRATE UR QL: NEGATIVE
NON-SQ EPI CELLS #/AREA URNS LPF: ABNORMAL /LPF
PH UR STRIP: 7.5 PH (ref 5–7)
RBC #/AREA URNS AUTO: ABNORMAL /HPF
SOURCE: ABNORMAL
SP GR UR STRIP: <=1.005 (ref 1–1.03)
UROBILINOGEN UR STRIP-ACNC: 0.2 EU/DL (ref 0.2–1)
WBC #/AREA URNS AUTO: ABNORMAL /HPF

## 2020-11-24 PROCEDURE — 99213 OFFICE O/P EST LOW 20 MIN: CPT | Performed by: PHYSICIAN ASSISTANT

## 2020-11-24 PROCEDURE — 81001 URINALYSIS AUTO W/SCOPE: CPT | Performed by: PHYSICIAN ASSISTANT

## 2020-11-24 RX ORDER — FLUCONAZOLE 150 MG/1
150 TABLET ORAL ONCE
Qty: 1 TABLET | Refills: 0 | Status: SHIPPED | OUTPATIENT
Start: 2020-11-24 | End: 2020-11-24

## 2020-11-24 NOTE — LETTER
November 24, 2020      Yenny Ortiz  [address] [place] [address]     [facility] [address]        To Whom It May Concern,      Please excuse Yenny from work today due to illness.            Sincerely,        Lise Mancilla PA-C

## 2020-11-24 NOTE — PROGRESS NOTES
Subjective     Yenny Ortiz is a 25 year old female who presents to clinic today for the following health issues:    HPI         Genitourinary - Female  Onset/Duration: 3 days   Description:   Painful urination (Dysuria): YES burning            Frequency: YES  Blood in urine (Hematuria): no  Delay in urine (Hesitency): no  Intensity: 8/10  Progression of Symptoms:  same  Accompanying Signs & Symptoms:  Fever/chills: no  Flank pain: no  Nausea and vomiting: no  Vaginal symptoms: none and itching  Abdominal/Pelvic Pain: no  History:   History of frequent UTI s: YES  History of kidney stones: no  Sexually Active: YES  Possibility of pregnancy: No  Precipitating or alleviating factors: None  Therapies tried and outcome: Cranberry juice,tylenol,AZO   No vaginal discharge.  Some itching started yesterday.  No sores.  No new partners or concerns for sexually transmitted disease.    Diflucan helped last time.          Review of Systems   As above      Objective    /76   Pulse 99   Temp 98.6  F (37  C) (Tympanic)   Wt 81.6 kg (180 lb)   LMP 10/28/2020 (Exact Date)   BMI 27.28 kg/m    Body mass index is 27.28 kg/m .  Physical Exam  Constitutional:       General: She is not in acute distress.  Cardiovascular:      Rate and Rhythm: Normal rate and regular rhythm.   Pulmonary:      Effort: Pulmonary effort is normal.      Breath sounds: Normal breath sounds.   Abdominal:      General: Bowel sounds are normal.      Tenderness: There is no abdominal tenderness. There is no right CVA tenderness or left CVA tenderness.   Neurological:      Mental Status: She is alert.            Results for orders placed or performed in visit on 11/24/20 (from the past 24 hour(s))   UA reflex to Microscopic and Culture    Specimen: Midstream Urine   Result Value Ref Range    Color Urine Yellow     Appearance Urine Clear     Glucose Urine Negative NEG^Negative mg/dL    Bilirubin Urine Negative NEG^Negative    Ketones Urine Negative  "NEG^Negative mg/dL    Specific Gravity Urine <=1.005 1.003 - 1.035    Blood Urine Negative NEG^Negative    pH Urine 7.5 (H) 5.0 - 7.0 pH    Protein Albumin Urine Negative NEG^Negative mg/dL    Urobilinogen Urine 0.2 0.2 - 1.0 EU/dL    Nitrite Urine Negative NEG^Negative    Leukocyte Esterase Urine Trace (A) NEG^Negative    Source Midstream Urine    Urine Microscopic   Result Value Ref Range    WBC Urine 0 - 5 OTO5^0 - 5 /HPF    RBC Urine O - 2 OTO2^O - 2 /HPF    Squamous Epithelial /LPF Urine Few FEW^Few /LPF    Bacteria Urine Few (A) NEG^Negative /HPF           Assessment & Plan     Urine frequency  No infection noted.  Last time this happened she had no growth and states the diflucan helped the most despite a normal wet prep.    - UA reflex to Microscopic and Culture  - Urine Microscopic    Vaginal burning  Will treat yeast.  encouraged no products to be used in the vaginal area and if not improving will need a pelvic exam.    - fluconazole (DIFLUCAN) 150 MG tablet; Take 1 tablet (150 mg) by mouth once for 1 dose     BMI:   Estimated body mass index is 27.28 kg/m  as calculated from the following:    Height as of 1/30/20: 1.73 m (5' 8.11\").    Weight as of this encounter: 81.6 kg (180 lb).                Return in about 5 days (around 11/29/2020) for if not improving.    JOLLY Faye [facility]      "

## 2020-11-27 ENCOUNTER — TELEPHONE (OUTPATIENT)
Dept: FAMILY MEDICINE | Facility: CLINIC | Age: 25
End: 2020-11-27

## 2020-11-27 NOTE — TELEPHONE ENCOUNTER
Attempt # 1  Called patient at home number.[phone] (Home Phone)   Was call answered?  Yes, relayed message from provider to patient, Patient verbalized understanding and agreement with plan and had no questions and then states will see how the symptoms are over the weekend and call for appointment on Monday if necessary.            Fabiola Uribe RN  [facility]

## 2020-11-27 NOTE — TELEPHONE ENCOUNTER
Reason for call:  Patient reporting a symptom    Symptom or request: irriattion with using the bathroom (ongoing)     Duration (how long have symptoms been present): several days    Have you been treated for this before? Yes    Additional comments: Had visit, was given 11/24 was given Rx for fluconazole (DIFLUCAN) 150 MG tablet. It appears that pill is not helping, still having symptoms. Mother is wondering about possibly trying another prescription. States the patient doesn't want to come in right now since she has her period.     Would like a call back to discuss.     Phone Number patient can be reached at:  Home number on file [phone] (home)    Best Time:  today    Can we leave a detailed message on this number:  YES    Call taken on 11/27/2020 at 10:14 AM by Kezia Suazo

## 2020-11-27 NOTE — TELEPHONE ENCOUNTER
Patient's urine was normal at that appointment. I would not recommend antibiotics without new exam and likely STD testing as patient is sexually active. Should be seen in clinic or UC.   Hollie Conklin PA-C

## 2020-11-27 NOTE — TELEPHONE ENCOUNTER
Attempt # 1  Called patient at home number.  Was call answered?  Yes, itching, burning, itching is very bad - burns to urinate but is able to urinate.   Took the diflucan on Wednesday.       Is sexually active, uses condoms.    Pharmacy cued.  Routing to provider to review and advise.      Fabiola Uribe RN  [facility]

## 2021-02-11 NOTE — CONFIDENTIAL NOTE
Pending Prescriptions:                       Disp   Refills    sertraline (ZOLOFT) 25 MG tablet [Pharmac*30 tab*0            Sig: TAKE 1 TABLET BY MOUTH EVERY DAY    Routing refill request to provider for review/approval because:  Drug not active on patient's medication list    Zoila Givens RN

## 2021-02-17 RX ORDER — SERTRALINE HYDROCHLORIDE 25 MG/1
TABLET, FILM COATED ORAL
Qty: 30 TABLET | Refills: 0 | OUTPATIENT
Start: 2021-02-17

## 2021-02-24 ENCOUNTER — OFFICE VISIT (OUTPATIENT)
Dept: FAMILY MEDICINE | Facility: CLINIC | Age: 26
End: 2021-02-24
Payer: COMMERCIAL

## 2021-02-24 VITALS
DIASTOLIC BLOOD PRESSURE: 68 MMHG | WEIGHT: 191 LBS | BODY MASS INDEX: 28.95 KG/M2 | HEIGHT: 68 IN | SYSTOLIC BLOOD PRESSURE: 120 MMHG

## 2021-02-24 DIAGNOSIS — G43.809 OTHER MIGRAINE WITHOUT STATUS MIGRAINOSUS, NOT INTRACTABLE: ICD-10-CM

## 2021-02-24 DIAGNOSIS — L70.0 ACNE VULGARIS: ICD-10-CM

## 2021-02-24 PROCEDURE — 99214 OFFICE O/P EST MOD 30 MIN: CPT | Performed by: NURSE PRACTITIONER

## 2021-02-24 RX ORDER — ADAPALENE 0.1 G/100G
CREAM TOPICAL
Qty: 45 G | Refills: 1 | Status: SHIPPED | OUTPATIENT
Start: 2021-02-24 | End: 2022-10-12

## 2021-02-24 RX ORDER — PROPRANOLOL HYDROCHLORIDE 80 MG/1
80 CAPSULE, EXTENDED RELEASE ORAL DAILY
Qty: 90 CAPSULE | Refills: 1 | Status: SHIPPED | OUTPATIENT
Start: 2021-02-24 | End: 2021-10-13

## 2021-02-24 RX ORDER — SUMATRIPTAN 25 MG/1
25-50 TABLET, FILM COATED ORAL
Qty: 9 TABLET | Refills: 3 | Status: SHIPPED | OUTPATIENT
Start: 2021-02-24 | End: 2021-06-15

## 2021-02-24 RX ORDER — DOXYCYCLINE HYCLATE 50 MG/1
50 CAPSULE ORAL 2 TIMES DAILY
Qty: 180 CAPSULE | Refills: 1 | Status: SHIPPED | OUTPATIENT
Start: 2021-02-24 | End: 2021-08-23

## 2021-02-24 ASSESSMENT — PATIENT HEALTH QUESTIONNAIRE - PHQ9
SUM OF ALL RESPONSES TO PHQ QUESTIONS 1-9: 11
5. POOR APPETITE OR OVEREATING: SEVERAL DAYS

## 2021-02-24 ASSESSMENT — ANXIETY QUESTIONNAIRES
IF YOU CHECKED OFF ANY PROBLEMS ON THIS QUESTIONNAIRE, HOW DIFFICULT HAVE THESE PROBLEMS MADE IT FOR YOU TO DO YOUR WORK, TAKE CARE OF THINGS AT HOME, OR GET ALONG WITH OTHER PEOPLE: SOMEWHAT DIFFICULT
6. BECOMING EASILY ANNOYED OR IRRITABLE: MORE THAN HALF THE DAYS
1. FEELING NERVOUS, ANXIOUS, OR ON EDGE: MORE THAN HALF THE DAYS
3. WORRYING TOO MUCH ABOUT DIFFERENT THINGS: MORE THAN HALF THE DAYS
GAD7 TOTAL SCORE: 11
7. FEELING AFRAID AS IF SOMETHING AWFUL MIGHT HAPPEN: SEVERAL DAYS
2. NOT BEING ABLE TO STOP OR CONTROL WORRYING: MORE THAN HALF THE DAYS
5. BEING SO RESTLESS THAT IT IS HARD TO SIT STILL: SEVERAL DAYS

## 2021-02-24 ASSESSMENT — MIFFLIN-ST. JEOR: SCORE: 1669.87

## 2021-02-24 NOTE — PROGRESS NOTES
Assessment & Plan     Acne vulgaris  Slightly worsened lately as she ran out of medications. Medications were controlling acne. Medications refilled at this time.    - doxycycline hyclate (VIBRAMYCIN) 50 MG capsule; Take 1 capsule (50 mg) by mouth 2 times daily  - adapalene (DIFFERIN) 0.1 % external cream; Apply to face once daily    Other migraine without status migrainosus, not intractable  Stable while on medications. Medications refilled today. Blood pressure stable.   - SUMAtriptan (IMITREX) 25 MG tablet; Take 1-2 tablets (25-50 mg) by mouth at onset of headache for migraine  - propranolol ER (INDERAL LA) 80 MG 24 hr capsule; Take 1 capsule (80 mg) by mouth daily    She declined STD screening and vaccines today     Return in about 7 months (around 10/7/2021) for Physical Exam with repeat pap smear .    Talya Pastor NP  [facility]    Wally Ramon is a 25 year old who presents for the following health issues     HPI       Migraine     Since your last clinic visit, how have your headaches changed?  Improved-much better    How often are you getting headaches or migraines? Once a month, but not very often.    Are you able to do normal daily activities when you have a migraine? Yes pushes thorugh it    Are you taking rescue/relief medications? (Select all that apply) ibuprofen (Advil, Motrin), Tylenol and sumatriptan (Imitrex)    How helpful is your rescue/relief medication?      Are you taking any medications to prevent migraines? (Select all that apply)      In the past 4 weeks, how often have you gone to urgent care or the emergency room because of your headaches?  0      How many days per week do you miss taking your medication? 0    She ran out of medications about 2 weeks ago. She states since then her skin has been starting to break out (face, chest and back).     She denies concerns for anxiety/depression. She has been busy with work and enjoys spending time with  "her sister.       Review of Systems   Constitutional, HEENT, cardiovascular, pulmonary, gi and gu systems are negative, except acne       Objective    /68 (BP Location: Right arm, Patient Position: Sitting, Cuff Size: Adult Regular)   Ht 1.727 m (5' 8\")   Wt 86.6 kg (191 lb)   BMI 29.04 kg/m    Body mass index is 29.04 kg/m .  Physical Exam   GENERAL: healthy, alert and no distress  EYES: Eyes grossly normal to inspection, and conjunctivae and sclerae normal  RESP: lungs clear to auscultation - no rales, rhonchi or wheezes  CV: regular rate and rhythm, normal S1 S2, no S3 or S4, no murmur, click or rub, no peripheral edema   MS: no gross musculoskeletal defects noted, no edema  SKIN: no suspicious lesions or rashes  NEURO: Normal strength and tone, mentation intact and speech normal  PSYCH: mentation appears normal, affect normal/bright      "

## 2021-02-25 ASSESSMENT — ANXIETY QUESTIONNAIRES: GAD7 TOTAL SCORE: 11

## 2021-02-26 ENCOUNTER — TELEPHONE (OUTPATIENT)
Dept: FAMILY MEDICINE | Facility: CLINIC | Age: 26
End: 2021-02-26

## 2021-02-26 NOTE — TELEPHONE ENCOUNTER
Prior Authorization Retail Medication Request    Medication/Dose: adapalene (DIFFERIN) 0.1 % external cream  ICD code (if different than what is on RX):  unknown  Previously Tried and Failed:  unknown  Rationale:  unknown    Insurance Name:  unknown  Insurance ID:  unknown      Pharmacy Information (if different than what is on RX)  Name:  [facility]  Phone:  unknown

## 2021-03-01 NOTE — TELEPHONE ENCOUNTER
Central Prior Authorization Team   Phone: [phone]      PA NOT NEEDED    Medication: adapalene (DIFFERIN) 0.1 % external cream-PA NOT NEEDED  Insurance Company:    Pharmacy Filling the Rx: [id number] - [place], [place] - [address]  Filling Pharmacy Phone: [phone]  Filling Pharmacy Fax:    Start Date: 3/1/2021    Called pharmacy to get 3rd party billing information and per pharmacy PA is not needed.  They received a paid claim and it has $1 copay.  Pharmacy will notify patient when medication is ready.

## 2021-05-08 RX ORDER — SERTRALINE HYDROCHLORIDE 25 MG/1
TABLET, FILM COATED ORAL
Qty: 30 TABLET | OUTPATIENT
Start: 2021-05-08

## 2021-05-26 ENCOUNTER — RECORDS - HEALTHEAST (OUTPATIENT)
Dept: ADMINISTRATIVE | Facility: CLINIC | Age: 26
End: 2021-05-26

## 2021-06-07 NOTE — TELEPHONE ENCOUNTER
Called patient and left VM to call clinic in regards to medication refill request.  Sabrina HERNANDES CMA ([facility])

## 2021-06-09 NOTE — TELEPHONE ENCOUNTER
Attempt 2, Called patient and left VM to call clinic in regards to medication refill request.  Sabrina HERNANDES CMA ([facility])

## 2021-06-14 NOTE — TELEPHONE ENCOUNTER
Left message for patient to call back. Please clarify with patient if she is requesting for Sertraline (not on her medication list).     [id number], MEDICAL ASSISTANT

## 2021-06-15 DIAGNOSIS — G43.809 OTHER MIGRAINE WITHOUT STATUS MIGRAINOSUS, NOT INTRACTABLE: ICD-10-CM

## 2021-06-15 RX ORDER — SERTRALINE HYDROCHLORIDE 25 MG/1
TABLET, FILM COATED ORAL
Qty: 30 TABLET | OUTPATIENT
Start: 2021-06-15

## 2021-06-15 RX ORDER — SUMATRIPTAN 25 MG/1
25-50 TABLET, FILM COATED ORAL
Qty: 9 TABLET | Refills: 3 | Status: SHIPPED | OUTPATIENT
Start: 2021-06-15 | End: 2022-10-12

## 2021-06-15 NOTE — TELEPHONE ENCOUNTER
Routing refill request to provider for review/approval because:  Serotonin agonist needs review          Pending Prescriptions:                       Disp   Refills    SUMAtriptan (IMITREX) 25 MG tablet         9 tabl*3        Sig: Take 1-2 tablets (25-50 mg) by mouth at onset of           headache for migraine        Bhargav Oneil RN

## 2021-06-15 NOTE — TELEPHONE ENCOUNTER
Called patient and spoke with patient. Patient stated she have NOT take sertraline for a long time. Per patient she did not request for the medication. Please disregard request.  Yesica Moon, OL[facility]     36.2

## 2021-08-18 ENCOUNTER — OFFICE VISIT (OUTPATIENT)
Dept: FAMILY MEDICINE | Facility: CLINIC | Age: 26
End: 2021-08-18
Payer: COMMERCIAL

## 2021-08-18 VITALS
WEIGHT: 194.8 LBS | SYSTOLIC BLOOD PRESSURE: 120 MMHG | DIASTOLIC BLOOD PRESSURE: 74 MMHG | HEART RATE: 84 BPM | OXYGEN SATURATION: 99 % | BODY MASS INDEX: 29.62 KG/M2 | TEMPERATURE: 98.4 F

## 2021-08-18 DIAGNOSIS — F33.0 MILD EPISODE OF RECURRENT MAJOR DEPRESSIVE DISORDER (H): ICD-10-CM

## 2021-08-18 DIAGNOSIS — Z11.3 SCREEN FOR STD (SEXUALLY TRANSMITTED DISEASE): Primary | ICD-10-CM

## 2021-08-18 PROCEDURE — 87389 HIV-1 AG W/HIV-1&-2 AB AG IA: CPT | Performed by: FAMILY MEDICINE

## 2021-08-18 PROCEDURE — 90471 IMMUNIZATION ADMIN: CPT | Performed by: FAMILY MEDICINE

## 2021-08-18 PROCEDURE — 87591 N.GONORRHOEAE DNA AMP PROB: CPT | Performed by: FAMILY MEDICINE

## 2021-08-18 PROCEDURE — 36415 COLL VENOUS BLD VENIPUNCTURE: CPT | Performed by: FAMILY MEDICINE

## 2021-08-18 PROCEDURE — 90715 TDAP VACCINE 7 YRS/> IM: CPT | Performed by: FAMILY MEDICINE

## 2021-08-18 PROCEDURE — 99213 OFFICE O/P EST LOW 20 MIN: CPT | Mod: 25 | Performed by: FAMILY MEDICINE

## 2021-08-18 PROCEDURE — 96127 BRIEF EMOTIONAL/BEHAV ASSMT: CPT | Performed by: FAMILY MEDICINE

## 2021-08-18 PROCEDURE — 86803 HEPATITIS C AB TEST: CPT | Performed by: FAMILY MEDICINE

## 2021-08-18 PROCEDURE — 87491 CHLMYD TRACH DNA AMP PROBE: CPT | Performed by: FAMILY MEDICINE

## 2021-08-18 RX ORDER — SERTRALINE HYDROCHLORIDE 25 MG/1
25 TABLET, FILM COATED ORAL DAILY
Qty: 90 TABLET | Refills: 3 | Status: SHIPPED | OUTPATIENT
Start: 2021-08-18 | End: 2022-10-12 | Stop reason: DRUGHIGH

## 2021-08-18 RX ORDER — SERTRALINE HYDROCHLORIDE 25 MG/1
25 TABLET, FILM COATED ORAL DAILY
COMMUNITY
Start: 2021-02-17 | End: 2021-08-18

## 2021-08-18 ASSESSMENT — PATIENT HEALTH QUESTIONNAIRE - PHQ9: SUM OF ALL RESPONSES TO PHQ QUESTIONS 1-9: 11

## 2021-08-18 NOTE — PROGRESS NOTES
Assessment & Plan     Screen for STD (sexually transmitted disease)  - NEISSERIA GONORRHOEA PCR; Future  - CHLAMYDIA TRACHOMATIS PCR; Future  - Hepatitis C antibody; Future  - HIV Antigen Antibody Combo; Future  - Hepatitis C antibody  - HIV Antigen Antibody Combo  - NEISSERIA GONORRHOEA PCR  - CHLAMYDIA TRACHOMATIS PCR    Mild episode of recurrent major depressive disorder (H)  - Stable   - sertraline (ZOLOFT) 25 MG tablet; Take 1 tablet (25 mg) by mouth daily      Return in about 2 months (around 10/18/2021) for Physical Exam.    Kim Ortiz, DO  [facility]    ======================================================================    Subjective   Yenny is a 25 year old who presents for the following health issues:    HPI     Requesting STD testing.  No new partners.  No symptoms.  Just wants to check and make sure.      Depression Followup    How are you doing with your depression since your last visit? Off and on    Are you having other symptoms that might be associated with depression? No    Have you had a significant life event?  No     Are you feeling anxious or having panic attacks?   Yes:       Do you have any concerns with your use of alcohol or other drugs? No     Currently taking sertraline 25 mg daily.  Feels it's working well.     Social History     Tobacco Use     Smoking status: Never Smoker     Smokeless tobacco: Never Used   Substance Use Topics     Alcohol use: No     Drug use: No     PHQ 8/7/2020 2/24/2021 8/18/2021   PHQ-9 Total Score 12 11 11   Q9: Thoughts of better off dead/self-harm past 2 weeks Not at all Not at all Several days     TJ-7 SCORE 5/13/2019 1/30/2020 2/24/2021   Total Score 11 9 11     Last PHQ-9 8/18/2021   1.  Little interest or pleasure in doing things 1   2.  Feeling down, depressed, or hopeless 1   3.  Trouble falling or staying asleep, or sleeping too much 2   4.  Feeling tired or having little energy 1   5.  Poor appetite or  overeating 2   6.  Feeling bad about yourself 1   7.  Trouble concentrating 1   8.  Moving slowly or restless 1   Q9: Thoughts of better off dead/self-harm past 2 weeks 1   PHQ-9 Total Score 11   Difficulty at work, home, or with people Somewhat difficult     TJ-7  2/24/2021   1. Feeling nervous, anxious, or on edge 2   2. Not being able to stop or control worrying 2   3. Worrying too much about different things 2   4. Trouble relaxing 1   5. Being so restless that it is hard to sit still 1   6. Becoming easily annoyed or irritable 2   7. Feeling afraid, as if something awful might happen 1   TJ-7 Total Score 11   If you checked any problems, how difficult have they made it for you to do your work, take care of things at home, or get along with other people? Somewhat difficult       How many servings of fruits and vegetables do you eat daily?  0-1    On average, how many sweetened beverages do you drink each day (Examples: soda, juice, sweet tea, etc.  Do NOT count diet or artificially sweetened beverages)?   2    How many days per week do you exercise enough to make your heart beat faster? Walking twice a week    How many minutes a day do you exercise enough to make your heart beat faster? 30 - 60    How many days per week do you miss taking your medication? 0      Review of Systems   Constitutional, HEENT, cardiovascular, pulmonary, gi and gu systems are negative, except as otherwise noted.      Objective    /74 (BP Location: Right arm, Patient Position: Chair, Cuff Size: Adult Regular)   Pulse 84   Temp 98.4  F (36.9  C) (Oral)   Wt 88.4 kg (194 lb 12.8 oz)   SpO2 99%   BMI 29.62 kg/m    Body mass index is 29.62 kg/m .  Physical Exam   GENERAL: healthy, alert and no distress  PSYCH: mentation appears normal, affect normal/bright

## 2021-08-18 NOTE — LETTER
August 20, 2021      Yenny Ortiz  [address] [place] [address]    [facility] [address]        Dear ,    We are writing to inform you of your test results.      Your STD testing is all negative.       Please let us know if you have further concerns or questions.     Resulted Orders   Hepatitis C antibody   Result Value Ref Range    Hepatitis C Antibody Nonreactive Nonreactive    Narrative    Assay performance characteristics have not been established for newborns, infants, and children.   HIV Antigen Antibody Combo   Result Value Ref Range    HIV Antigen Antibody Combo Nonreactive Nonreactive      Comment:      HIV-1 p24 Ag & HIV-1/HIV-2 Ab Not Detected   NEISSERIA GONORRHOEA PCR   Result Value Ref Range    Neisseria gonorrhoeae Negative Negative      Comment:      Negative for N. gonorrhoeae rRNA by transcription mediated amplification. A negative result by transcription mediated amplification does not preclude the presence of C. trachomatis infection because results are dependent on proper and adequate collection, absence of inhibitors and sufficient rRNA to be detected.   CHLAMYDIA TRACHOMATIS PCR   Result Value Ref Range    Chlamydia trachomatis Negative Negative      Comment:      A negative result by transcription mediated amplification does not preclude the presence of C. trachomatis infection because results are dependent on proper and adequate collection, absence of inhibitors and sufficient rRNA to be detected.       If you have any questions or concerns, please call the clinic at the number listed above.       Sincerely,      Kim Ortiz, DO

## 2021-08-19 LAB
C TRACH DNA SPEC QL NAA+PROBE: NEGATIVE
HCV AB SERPL QL IA: NONREACTIVE
HIV 1+2 AB+HIV1 P24 AG SERPL QL IA: NONREACTIVE
N GONORRHOEA DNA SPEC QL NAA+PROBE: NEGATIVE

## 2021-09-23 ENCOUNTER — PATIENT OUTREACH (OUTPATIENT)
Dept: FAMILY MEDICINE | Facility: CLINIC | Age: 26
End: 2021-09-23
Payer: COMMERCIAL

## 2021-09-23 DIAGNOSIS — R87.610 ATYPICAL SQUAMOUS CELLS OF UNDETERMINED SIGNIFICANCE (ASCUS) ON PAPANICOLAOU SMEAR OF CERVIX: ICD-10-CM

## 2021-09-23 NOTE — LETTER
September 23, 2021      Yennyperez Ortiz  [address] [place] [address]  [facility] [address]        Dear ,    This letter is to remind you that you are due for your follow-up Pap smear.    Please call [phone] to schedule your appointment at your earliest convenience.    If you have completed the appointment outside of the [facility] system, please have the records forwarded to our office. We will update your chart for your provider to review before your next annual wellness visit.     Thank you for choosing [facility]!      Sincerely,    Your [facility] Care Team

## 2021-10-07 NOTE — TELEPHONE ENCOUNTER
Patient have already schedule an appointment on 02/24/2021 with Dr. Pastor to get Medication Renew.  Yesica Moon, [facility]    
Will need a follow up visit first.  Can be virtual.    Lise Mancilla PA-C   
98.1

## 2021-11-19 NOTE — TELEPHONE ENCOUNTER
FYI to provider - Patient is lost to pap tracking follow-up. Attempts to contact pt have been made per reminder process and there has been no reply and/or no appt scheduled.       10/7/20 ASCUS pap at 23 yo. Plan pap in 1 year due by 10/7/21.  10/15/20  to send letter to pt.   9/23/21 Reminder letter  10/22/21 Reminder call=LM  11/19/21 Lost to follow up

## 2022-01-27 ENCOUNTER — PATIENT OUTREACH (OUTPATIENT)
Dept: FAMILY MEDICINE | Facility: CLINIC | Age: 27
End: 2022-01-27
Payer: COMMERCIAL

## 2022-01-27 NOTE — LETTER
January 27, 2022      Yenny Ortiz  [address] [place] [address]    [facility] [address]      Your healthcare team cares about your health. To provide you with the best care,   we have reviewed your chart and based on our findings, we see that you are due to:     - OTHER FOLLOW UP:  pap smear and physical     If you have already completed these items, please contact the clinic via phone or   Mychart so your care team can review and update your records. Thank you for   choosing [facility] for your healthcare needs. For any questions,   concerns, or to schedule an appointment please contact the clinic.       Healthy Regards,      Your [facility] Care Team

## 2022-01-27 NOTE — TELEPHONE ENCOUNTER
Patient Quality Outreach      Summary:    Patient has the following on her problem list/HM:     Depression / Dysthymia review    6 Month Remission: 4-8 month window range:   12 Month Remission: 10-14 month window range:       PHQ-9 SCORE 8/7/2020 2/24/2021 8/18/2021   PHQ-9 Total Score 12 11 11       If PHQ-9 recheck is 5 or more, route to provider for next steps.    Patient is due/failing the following:   Cervical Cancer Screening - PAP Needed    Type of outreach:    Sent letter.    Questions for provider review:    None                                                                                                                                     Justin London MA       Chart routed to Care Team.

## 2022-01-27 NOTE — LETTER
February 24, 2022      Yenny Ortiz  [address] [place] [address]    [facility] [address]      Your healthcare team cares about your health. To provide you with the best care,   we have reviewed your chart and based on our findings, we see that you are due to:     - OTHER FOLLOW UP:  well child check     If you have already completed these items, please contact the clinic via phone or   Mychart so your care team can review and update your records. Thank you for   choosing [facility] for your healthcare needs. For any questions,   concerns, or to schedule an appointment please contact the clinic.       Healthy Regards,      Your [facility] Care Team

## 2022-02-24 NOTE — TELEPHONE ENCOUNTER
Patient Quality Outreach    Patient is due for the following:   Immunizations  -  Covid, DTAP, Influenza and IPV and well child check     NEXT STEPS:   Needs well child check     Type of outreach:    Sent letter.    Next Steps:  Reach out within 90 days via Letter.    Max number of attempts reached: No. Will try again in 90 days if patient still on fail list.    Questions for provider review:    None     Justin London, [facility]

## 2022-08-11 DIAGNOSIS — L70.0 ACNE VULGARIS: ICD-10-CM

## 2022-08-11 NOTE — TELEPHONE ENCOUNTER
[id number] faxed a Refill Request    1) - adapalene (DIFFERIN) 0.1 % external cream    2) - doxycycline hyclate (VIBRAMYCIN) 50 MG capsule          Last Written Prescription Date:  2021  Last Fill Quantity: 180 capsule,   # refills: 1  Last Office Visit: 2021 DAVID Stiles    Future Office visit:       Routing refill request to provider for review/approval because:  Drug not active on patient's medication list

## 2022-08-11 NOTE — TELEPHONE ENCOUNTER
Patient needs to establish care with new PCP.     Team, please reach out and schedule.     Once scheduled, forward to provider to consider miryam refill.     Melisa García, RN, BSN, PHN  [facility] [place] [facility]

## 2022-08-31 NOTE — TELEPHONE ENCOUNTER
Routing refill request to provider for review/approval because:  Will not allow nurse to refuse mediation and close encounter.       Pili Acevedo RN

## 2022-09-05 RX ORDER — ADAPALENE 0.1 G/100G
CREAM TOPICAL
Qty: 45 G | Refills: 0 | OUTPATIENT
Start: 2022-09-05

## 2022-09-05 RX ORDER — DOXYCYCLINE HYCLATE 50 MG/1
50 CAPSULE ORAL 2 TIMES DAILY
Qty: 180 CAPSULE | Refills: 0 | OUTPATIENT
Start: 2022-09-05

## 2022-10-12 ENCOUNTER — DOCUMENTATION ONLY (OUTPATIENT)
Dept: OTHER | Facility: CLINIC | Age: 27
End: 2022-10-12

## 2022-10-12 ENCOUNTER — OFFICE VISIT (OUTPATIENT)
Dept: FAMILY MEDICINE | Facility: CLINIC | Age: 27
End: 2022-10-12
Payer: COMMERCIAL

## 2022-10-12 VITALS
WEIGHT: 195 LBS | SYSTOLIC BLOOD PRESSURE: 131 MMHG | HEIGHT: 69 IN | OXYGEN SATURATION: 99 % | DIASTOLIC BLOOD PRESSURE: 80 MMHG | TEMPERATURE: 99 F | BODY MASS INDEX: 28.88 KG/M2 | HEART RATE: 100 BPM

## 2022-10-12 DIAGNOSIS — F41.9 ANXIETY: ICD-10-CM

## 2022-10-12 DIAGNOSIS — G43.809 OTHER MIGRAINE WITHOUT STATUS MIGRAINOSUS, NOT INTRACTABLE: ICD-10-CM

## 2022-10-12 DIAGNOSIS — L70.0 ACNE VULGARIS: ICD-10-CM

## 2022-10-12 DIAGNOSIS — Z00.00 ROUTINE HISTORY AND PHYSICAL EXAMINATION OF ADULT: Primary | ICD-10-CM

## 2022-10-12 DIAGNOSIS — F33.0 MILD EPISODE OF RECURRENT MAJOR DEPRESSIVE DISORDER (H): ICD-10-CM

## 2022-10-12 PROCEDURE — 99214 OFFICE O/P EST MOD 30 MIN: CPT | Mod: 25 | Performed by: PHYSICIAN ASSISTANT

## 2022-10-12 PROCEDURE — 99395 PREV VISIT EST AGE 18-39: CPT | Performed by: PHYSICIAN ASSISTANT

## 2022-10-12 RX ORDER — SUMATRIPTAN 25 MG/1
25-50 TABLET, FILM COATED ORAL
Qty: 9 TABLET | Refills: 3 | Status: SHIPPED | OUTPATIENT
Start: 2022-10-12 | End: 2023-06-06

## 2022-10-12 RX ORDER — PROPRANOLOL HYDROCHLORIDE 80 MG/1
80 CAPSULE, EXTENDED RELEASE ORAL DAILY
Qty: 90 CAPSULE | Refills: 0 | Status: SHIPPED | OUTPATIENT
Start: 2022-10-12 | End: 2023-06-06

## 2022-10-12 RX ORDER — ADAPALENE 0.1 G/100G
CREAM TOPICAL
Qty: 45 G | Refills: 1 | Status: SHIPPED | OUTPATIENT
Start: 2022-10-12 | End: 2023-06-06

## 2022-10-12 RX ORDER — DOXYCYCLINE HYCLATE 100 MG/1
100 TABLET, DELAYED RELEASE ORAL DAILY
Qty: 90 TABLET | Refills: 3 | Status: SHIPPED | OUTPATIENT
Start: 2022-10-12 | End: 2022-10-28 | Stop reason: ALTCHOICE

## 2022-10-12 RX ORDER — DOXYCYCLINE HYCLATE 100 MG/1
100 TABLET, DELAYED RELEASE ORAL 2 TIMES DAILY
COMMUNITY
End: 2022-10-12

## 2022-10-12 ASSESSMENT — ENCOUNTER SYMPTOMS
HEARTBURN: 0
PARESTHESIAS: 0
NAUSEA: 1
HEMATOCHEZIA: 0
FEVER: 0
SHORTNESS OF BREATH: 0
CONSTIPATION: 0
MYALGIAS: 0
NERVOUS/ANXIOUS: 1
SORE THROAT: 0
CHILLS: 0
HEMATURIA: 0
ABDOMINAL PAIN: 0
JOINT SWELLING: 0
DIARRHEA: 0
ARTHRALGIAS: 0
COUGH: 0
PALPITATIONS: 0
FREQUENCY: 0
WEAKNESS: 1
DYSURIA: 0
HEADACHES: 1
DIZZINESS: 0

## 2022-10-12 ASSESSMENT — ANXIETY QUESTIONNAIRES
7. FEELING AFRAID AS IF SOMETHING AWFUL MIGHT HAPPEN: SEVERAL DAYS
1. FEELING NERVOUS, ANXIOUS, OR ON EDGE: MORE THAN HALF THE DAYS
6. BECOMING EASILY ANNOYED OR IRRITABLE: MORE THAN HALF THE DAYS
3. WORRYING TOO MUCH ABOUT DIFFERENT THINGS: SEVERAL DAYS
GAD7 TOTAL SCORE: 10
GAD7 TOTAL SCORE: 10
2. NOT BEING ABLE TO STOP OR CONTROL WORRYING: MORE THAN HALF THE DAYS
IF YOU CHECKED OFF ANY PROBLEMS ON THIS QUESTIONNAIRE, HOW DIFFICULT HAVE THESE PROBLEMS MADE IT FOR YOU TO DO YOUR WORK, TAKE CARE OF THINGS AT HOME, OR GET ALONG WITH OTHER PEOPLE: SOMEWHAT DIFFICULT
5. BEING SO RESTLESS THAT IT IS HARD TO SIT STILL: SEVERAL DAYS

## 2022-10-12 ASSESSMENT — PATIENT HEALTH QUESTIONNAIRE - PHQ9
SUM OF ALL RESPONSES TO PHQ QUESTIONS 1-9: 6
10. IF YOU CHECKED OFF ANY PROBLEMS, HOW DIFFICULT HAVE THESE PROBLEMS MADE IT FOR YOU TO DO YOUR WORK, TAKE CARE OF THINGS AT HOME, OR GET ALONG WITH OTHER PEOPLE: SOMEWHAT DIFFICULT
SUM OF ALL RESPONSES TO PHQ QUESTIONS 1-9: 6
5. POOR APPETITE OR OVEREATING: SEVERAL DAYS

## 2022-10-12 NOTE — PROGRESS NOTES
SUBJECTIVE:   CC: Yenny is an 26 year old who presents for preventive health visit.       Patient has been advised of split billing requirements and indicates understanding: Yes  Healthy Habits:     Getting at least 3 servings of Calcium per day:  NO    Bi-annual eye exam:  NO    Dental care twice a year:  Yes    Sleep apnea or symptoms of sleep apnea:  None    Diet:  Regular (no restrictions)    Frequency of exercise:  2-3 days/week    Duration of exercise:  15-30 minutes    Taking medications regularly:  No    Barriers to taking medications:  Problems remembering to take them    Medication side effects:  Lightheadedness    PHQ-2 Total Score: 2    Additional concerns today:  No      meds refill   Still feeling anxious.  Nervous leaving the house.  Not missing work but avoiding other activities.  Always feels light headedness described as her vision is off.  Happens when out.    Getting more migraines.  Feels behind eye on left and across top of head.  Is missing propanolol.  Might be contributing.  Was working well before.  Has not been using imitrex.  Getting 1-2 times a week.    Acne on chin with hair growth.  Still having regular periods.        Today's PHQ-2 Score:   PHQ-2 ( 1999 Pfizer) 10/12/2022   Q1: Little interest or pleasure in doing things 1   Q2: Feeling down, depressed or hopeless 1   PHQ-2 Score 2   Q1: Little interest or pleasure in doing things Several days   Q2: Feeling down, depressed or hopeless Several days   PHQ-2 Score 2       Abuse: Current or Past (Physical, Sexual or Emotional) - No  Do you feel safe in your environment? Yes    Have you ever done Advance Care Planning? (For example, a Health Directive, POLST, or a discussion with a medical provider or your loved ones about your wishes): No, advance care planning information given to patient to review.  Patient plans to discuss their wishes with loved ones or provider.      Social History     Tobacco Use     Smoking status: Never      "Smokeless tobacco: Never   Substance Use Topics     Alcohol use: No     If you drink alcohol do you typically have >3 drinks per day or >7 drinks per week? No    Alcohol Use 10/12/2022   Prescreen: >3 drinks/day or >7 drinks/week? No   Prescreen: >3 drinks/day or >7 drinks/week? -   No flowsheet data found.    Reviewed orders with patient.  Reviewed health maintenance and updated orders accordingly - Yes  Labs reviewed in EPIC    Breast Cancer Screening:        History of abnormal Pap smear: YES - updated in Problem List and Health Maintenance accordingly  PAP / HPV 10/7/2020   PAP (Historical) ASC-US(A)     Reviewed and updated as needed this visit by clinical staff   Tobacco  Allergies  Meds    Surg Hx  Fam Hx          Reviewed and updated as needed this visit by Provider    Allergies  Meds    Surg Hx  Fam Hx             Review of Systems   Constitutional: Negative for chills and fever.   HENT: Negative for congestion, ear pain, hearing loss and sore throat.    Eyes: Negative for visual disturbance.   Respiratory: Negative for cough and shortness of breath.    Cardiovascular: Negative for chest pain, palpitations and peripheral edema.   Gastrointestinal: Positive for nausea. Negative for abdominal pain, constipation, diarrhea, heartburn and hematochezia.   Genitourinary: Negative for dysuria, frequency, genital sores, hematuria and urgency.   Musculoskeletal: Negative for arthralgias, joint swelling and myalgias.   Skin: Negative for rash.   Neurological: Positive for weakness and headaches. Negative for dizziness and paresthesias.   Psychiatric/Behavioral: Positive for mood changes. The patient is nervous/anxious.           OBJECTIVE:   /80   Pulse 100   Temp 99  F (37.2  C) (Oral)   Ht 1.74 m (5' 8.5\")   Wt 88.5 kg (195 lb)   LMP 10/09/2022 (Exact Date)   SpO2 99%   BMI 29.21 kg/m    Physical Exam  Constitutional:       General: She is not in acute distress.     Appearance: She is " well-developed and well-nourished.   HENT:      Right Ear: Tympanic membrane, ear canal and external ear normal.      Left Ear: Tympanic membrane, ear canal and external ear normal.      Mouth/Throat:      Mouth: Oropharynx is clear and moist.      Pharynx: No oropharyngeal exudate.   Eyes:      Conjunctiva/sclera: Conjunctivae normal.   Neck:      Thyroid: No thyromegaly.      Trachea: No tracheal deviation.   Cardiovascular:      Rate and Rhythm: Normal rate and regular rhythm.      Heart sounds: Normal heart sounds, S1 normal and S2 normal. No murmur heard.    No friction rub. No S3 or S4 sounds.   Pulmonary:      Effort: Pulmonary effort is normal. No respiratory distress.      Breath sounds: Normal breath sounds. No wheezing or rales.   Chest:   Breasts:     Right: No mass, nipple discharge or tenderness.      Left: No mass, nipple discharge or tenderness.   Abdominal:      General: Bowel sounds are normal.      Palpations: Abdomen is soft. There is no hepatosplenomegaly or mass.      Tenderness: There is abdominal tenderness (umblical ).   Musculoskeletal:         General: No edema. Normal range of motion.      Cervical back: Neck supple.   Lymphadenopathy:      Cervical: No cervical adenopathy.   Skin:     General: Skin is warm and dry.      Findings: Lesion (multiple benign appearing moles) present. No rash.   Neurological:      Mental Status: She is alert and oriented to person, place, and time.      Motor: Motor strength is normal. No abnormal muscle tone.      Deep Tendon Reflexes: Reflexes are normal and symmetric.   Psychiatric:         Mood and Affect: Mood and affect normal.         Thought Content: Thought content normal.         Cognition and Memory: Cognition and memory normal.         Judgment: Judgment normal.           Diagnostic Test Results:  Labs reviewed in Epic    ASSESSMENT/PLAN:   (Z00.00) Routine history and physical examination of adult  (primary encounter diagnosis)  Comment:   Plan:  "continue to work on diet and exercise    (F33.0) Mild episode of recurrent major depressive disorder (H)  Comment:   Plan: sertraline (ZOLOFT) 50 MG tablet        Increase zoloft.  Follow up in one month     (G43.809) Other migraine without status migrainosus, not intractable  Comment:   Plan: propranolol ER (INDERAL LA) 80 MG 24 hr         capsule, SUMAtriptan (IMITREX) 25 MG tablet        No changes.  Start using propanolol regularly again     (L70.0) Acne vulgaris  Comment:   Plan: Doxycycline Hyclate 100 MG TBEC, adapalene         (DIFFERIN) 0.1 % external cream        refilled    (F41.9) Anxiety  Comment:   Plan: sertraline (ZOLOFT) 50 MG tablet        As above           COUNSELING:  Reviewed preventive health counseling, as reflected in patient instructions       Regular exercise       Healthy diet/nutrition       Contraception    Estimated body mass index is 29.21 kg/m  as calculated from the following:    Height as of this encounter: 1.74 m (5' 8.5\").    Weight as of this encounter: 88.5 kg (195 lb).    Weight management plan: Discussed healthy diet and exercise guidelines    She reports that she has never smoked. She has never used smokeless tobacco.      Counseling Resources:  ATP IV Guidelines  Pooled Cohorts Equation Calculator  Breast Cancer Risk Calculator  BRCA-Related Cancer Risk Assessment: FHS-7 Tool  FRAX Risk Assessment  ICSI Preventive Guidelines  Dietary Guidelines for Americans, 2010  USDA's MyPlate  ASA Prophylaxis  Lung CA Screening    Lise Mancilla PA-C  [facility] [place]  Answers for HPI/ROS submitted by the patient on 10/12/2022  If you checked off any problems, how difficult have these problems made it for you to do your work, take care of things at home, or get along with other people?: Somewhat difficult  PHQ9 TOTAL SCORE: 6      "

## 2023-01-06 NOTE — TELEPHONE ENCOUNTER
10/7/20 ASCUS pap at 23 yo. Plan pap in 1 year due by 10/7/21.   MEDICATIONS  (STANDING):  LORazepam    Concentrate 0.25 milliGRAM(s) Oral <User Schedule>  multivitamin 1 Tablet(s) Oral <User Schedule>  OLANZapine Injectable 2.5 milliGRAM(s) IntraMuscular <User Schedule>  risperiDONE   Solution 2 milliGRAM(s) Oral <User Schedule>    MEDICATIONS  (PRN):  acetaminophen     Tablet .. 650 milliGRAM(s) Oral every 6 hours PRN Moderate Pain (4 - 6)  aluminum hydroxide/magnesium hydroxide/simethicone Suspension 30 milliLiter(s) Oral every 4 hours PRN Dyspepsia  magnesium hydroxide Suspension 30 milliLiter(s) Oral daily PRN Constipation  OLANZapine 2.5 milliGRAM(s) Oral every 8 hours PRN agitation  traZODone 50 milliGRAM(s) Oral at bedtime PRN insomnia

## 2023-02-06 ENCOUNTER — TELEPHONE (OUTPATIENT)
Dept: FAMILY MEDICINE | Facility: CLINIC | Age: 28
End: 2023-02-06
Payer: COMMERCIAL

## 2023-02-06 NOTE — TELEPHONE ENCOUNTER
"RN called pharmacy to clarify.    Patient has prescription for doxycycline hyclate 100mg 90 capsule with 3 refills. Start date 10/28/2022.     Note from OV with PCP on 10/12/2022:   \"Acne vulgaris  Comment:   Plan: Doxycycline Hyclate 100 MG TBEC, adapalene         (DIFFERIN) 0.1 % external cream        refilled\"    The doxycycline hyclate 50mg BID is no longer in use.     Ethel Lazo RN    "

## 2023-02-06 NOTE — TELEPHONE ENCOUNTER
[id number] faxed a Refill Request    doxycycline hyclate (VIBRAMYCIN) 50 MG capsule        Last Written Prescription Date:  2021  Last Fill Quantity: 180 capsule,   # refills: 1  Last Office Visit: 10/12/2022    Future Office visit:       Routing refill request to provider for review/approval because:  Drug not active on patient's medication list

## 2023-06-06 ENCOUNTER — OFFICE VISIT (OUTPATIENT)
Dept: FAMILY MEDICINE | Facility: CLINIC | Age: 28
End: 2023-06-06
Payer: COMMERCIAL

## 2023-06-06 VITALS
BODY MASS INDEX: 30.92 KG/M2 | SYSTOLIC BLOOD PRESSURE: 114 MMHG | HEART RATE: 90 BPM | TEMPERATURE: 99 F | HEIGHT: 68 IN | RESPIRATION RATE: 17 BRPM | DIASTOLIC BLOOD PRESSURE: 75 MMHG | WEIGHT: 204 LBS

## 2023-06-06 DIAGNOSIS — R87.610 ATYPICAL SQUAMOUS CELLS OF UNDETERMINED SIGNIFICANCE (ASCUS) ON PAPANICOLAOU SMEAR OF CERVIX: Primary | ICD-10-CM

## 2023-06-06 DIAGNOSIS — F41.9 ANXIETY: ICD-10-CM

## 2023-06-06 DIAGNOSIS — F33.0 MILD EPISODE OF RECURRENT MAJOR DEPRESSIVE DISORDER (H): ICD-10-CM

## 2023-06-06 DIAGNOSIS — Z11.3 SCREEN FOR STD (SEXUALLY TRANSMITTED DISEASE): ICD-10-CM

## 2023-06-06 DIAGNOSIS — L70.0 ACNE VULGARIS: ICD-10-CM

## 2023-06-06 DIAGNOSIS — G43.809 OTHER MIGRAINE WITHOUT STATUS MIGRAINOSUS, NOT INTRACTABLE: ICD-10-CM

## 2023-06-06 LAB
CLUE CELLS: ABNORMAL
TRICHOMONAS, WET PREP: ABNORMAL
WBC'S/HIGH POWER FIELD, WET PREP: ABNORMAL
YEAST, WET PREP: ABNORMAL

## 2023-06-06 PROCEDURE — 87491 CHLMYD TRACH DNA AMP PROBE: CPT | Performed by: PHYSICIAN ASSISTANT

## 2023-06-06 PROCEDURE — 86695 HERPES SIMPLEX TYPE 1 TEST: CPT | Performed by: PHYSICIAN ASSISTANT

## 2023-06-06 PROCEDURE — 87591 N.GONORRHOEAE DNA AMP PROB: CPT | Performed by: PHYSICIAN ASSISTANT

## 2023-06-06 PROCEDURE — 99214 OFFICE O/P EST MOD 30 MIN: CPT | Performed by: PHYSICIAN ASSISTANT

## 2023-06-06 PROCEDURE — 87624 HPV HI-RISK TYP POOLED RSLT: CPT | Performed by: PHYSICIAN ASSISTANT

## 2023-06-06 PROCEDURE — 86696 HERPES SIMPLEX TYPE 2 TEST: CPT | Performed by: PHYSICIAN ASSISTANT

## 2023-06-06 PROCEDURE — 87210 SMEAR WET MOUNT SALINE/INK: CPT | Performed by: PHYSICIAN ASSISTANT

## 2023-06-06 PROCEDURE — 88175 CYTOPATH C/V AUTO FLUID REDO: CPT | Performed by: PHYSICIAN ASSISTANT

## 2023-06-06 PROCEDURE — 87389 HIV-1 AG W/HIV-1&-2 AB AG IA: CPT | Performed by: PHYSICIAN ASSISTANT

## 2023-06-06 PROCEDURE — 36415 COLL VENOUS BLD VENIPUNCTURE: CPT | Performed by: PHYSICIAN ASSISTANT

## 2023-06-06 PROCEDURE — 86803 HEPATITIS C AB TEST: CPT | Performed by: PHYSICIAN ASSISTANT

## 2023-06-06 PROCEDURE — 86780 TREPONEMA PALLIDUM: CPT | Performed by: PHYSICIAN ASSISTANT

## 2023-06-06 RX ORDER — DOXYCYCLINE 100 MG/1
100 CAPSULE ORAL DAILY
Qty: 90 CAPSULE | Refills: 3 | Status: SHIPPED | OUTPATIENT
Start: 2023-06-06 | End: 2024-06-19

## 2023-06-06 RX ORDER — ADAPALENE 0.1 G/100G
CREAM TOPICAL
Qty: 45 G | Refills: 1 | Status: SHIPPED | OUTPATIENT
Start: 2023-06-06 | End: 2024-06-19 | Stop reason: ALTCHOICE

## 2023-06-06 RX ORDER — PROPRANOLOL HYDROCHLORIDE 80 MG/1
80 CAPSULE, EXTENDED RELEASE ORAL DAILY
Qty: 90 CAPSULE | Refills: 0 | Status: SHIPPED | OUTPATIENT
Start: 2023-06-06 | End: 2023-08-16

## 2023-06-06 RX ORDER — SUMATRIPTAN 25 MG/1
25-50 TABLET, FILM COATED ORAL
Qty: 9 TABLET | Refills: 3 | Status: SHIPPED | OUTPATIENT
Start: 2023-06-06 | End: 2024-06-19

## 2023-06-06 RX ORDER — SERTRALINE HYDROCHLORIDE 100 MG/1
100 TABLET, FILM COATED ORAL DAILY
Qty: 90 TABLET | Refills: 0 | Status: SHIPPED | OUTPATIENT
Start: 2023-06-06 | End: 2023-08-16

## 2023-06-06 ASSESSMENT — PATIENT HEALTH QUESTIONNAIRE - PHQ9
SUM OF ALL RESPONSES TO PHQ QUESTIONS 1-9: 6
SUM OF ALL RESPONSES TO PHQ QUESTIONS 1-9: 6
10. IF YOU CHECKED OFF ANY PROBLEMS, HOW DIFFICULT HAVE THESE PROBLEMS MADE IT FOR YOU TO DO YOUR WORK, TAKE CARE OF THINGS AT HOME, OR GET ALONG WITH OTHER PEOPLE: SOMEWHAT DIFFICULT

## 2023-06-06 NOTE — PROGRESS NOTES
"  Assessment & Plan     Atypical squamous cells of undetermined significance (ASCUS) on Papanicolaou smear of cervix  Follow up as needed  - Pap diagnostic reflex to HPV if ASCUS    Mild episode of recurrent major depressive disorder (H  Increase again.  Stressed importance of taking daily.     sertraline (ZOLOFT) 100 MG tablet; Take 1 tablet (100 mg) by mouth daily    Anxiety  As above  - sertraline (ZOLOFT) 100 MG tablet; Take 1 tablet (100 mg) by mouth daily    Other migraine without status migrainosus, not intractable  refilled  - propranolol ER (INDERAL LA) 80 MG 24 hr capsule; Take 1 capsule (80 mg) by mouth daily  - SUMAtriptan (IMITREX) 25 MG tablet; Take 1-2 tablets (25-50 mg) by mouth at onset of headache for migraine    Acne vulgaris  refilled  - doxycycline hyclate (VIBRAMYCIN) 100 MG capsule; Take 1 capsule (100 mg) by mouth daily  - adapalene (DIFFERIN) 0.1 % external cream; Apply to face once daily    Screen for STD (sexually transmitted disease)  Follow up as needed  - Neisseria gonorrhoeae PCR  - Herpes Simplex Virus 1 and 2 IgG; Future  - Hepatitis C antibody; Future  - Chlamydia trachomatis PCR  - HIV Antigen Antibody Combo; Future  - Treponema Abs w Reflex to RPR and Titer; Future  - Wet prep - Clinic Collect  - Herpes Simplex Virus 1 and 2 IgG  - Hepatitis C antibody  - HIV Antigen Antibody Combo  - Treponema Abs w Reflex to RPR and Titer             BMI:   Estimated body mass index is 31.02 kg/m  as calculated from the following:    Height as of this encounter: 1.727 m (5' 8\").    Weight as of this encounter: 92.5 kg (204 lb).   Weight management plan: not addressed        Lise Mancilla PA-C  [facility] [place] Ramon is a 27 year old, presenting for the following health issues:  STD and Gyn Exam        6/6/2023    11:17 AM   Additional Questions   Roomed by Jeanette   Accompanied by self     STD    History of Present Illness       Reason for visit:  Pap " "smear STD testing    She eats 0-1 servings of fruits and vegetables daily.She consumes 1 sweetened beverage(s) daily.She exercises with enough effort to increase her heart rate 10 to 19 minutes per day.  She exercises with enough effort to increase her heart rate 3 or less days per week. She is missing 3 dose(s) of medications per week.    Today's PHQ-9         PHQ-9 Total Score: 6    PHQ-9 Q9 Thoughts of better off dead/self-harm past 2 weeks :   Not at all    How difficult have these problems made it for you to do your work, take care of things at home, or get along with other people: Somewhat difficult     Overall she feels her mood has improved.  She does sometimes for get to take her medications.    No desire to start birth control at this time.              Review of Systems   As above      Objective    /75   Pulse 90   Temp 99  F (37.2  C) (Oral)   Resp 17   Ht 1.727 m (5' 8\")   Wt 92.5 kg (204 lb)   LMP 06/04/2023   BMI 31.02 kg/m    Body mass index is 31.02 kg/m .  Physical Exam  Constitutional:       General: She is not in acute distress.  Genitourinary:     Vagina: Normal.      Cervix: Normal.   Neurological:      Mental Status: She is alert.   Psychiatric:         Mood and Affect: Mood normal.                            "

## 2023-06-07 LAB
C TRACH DNA SPEC QL NAA+PROBE: NEGATIVE
HCV AB SERPL QL IA: NONREACTIVE
HIV 1+2 AB+HIV1 P24 AG SERPL QL IA: NONREACTIVE
HSV1 IGG SERPL QL IA: 59.5 INDEX
HSV1 IGG SERPL QL IA: ABNORMAL
HSV2 IGG SERPL QL IA: 0.44 INDEX
HSV2 IGG SERPL QL IA: ABNORMAL
N GONORRHOEA DNA SPEC QL NAA+PROBE: NEGATIVE
T PALLIDUM AB SER QL: NONREACTIVE

## 2023-06-08 ENCOUNTER — TELEPHONE (OUTPATIENT)
Dept: FAMILY MEDICINE | Facility: CLINIC | Age: 28
End: 2023-06-08
Payer: COMMERCIAL

## 2023-06-08 NOTE — TELEPHONE ENCOUNTER
"Spoke with patient and gave PCP message:    \"Please call pt.  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  This is usually cold sores on the mouth but can be found in genital herpes as well.  If she has had oral cold sores in the past then this is what this is from.  There is nothing we need to do now.  If she gets a blister on her mouth or in her vaginal area then we can do treatment.  It is contagious -mostly when there are current lesions.\"    Patient verbalized understanding.    Shyla Kennedy RN  [facility]    "

## 2023-06-08 NOTE — TELEPHONE ENCOUNTER
Judy Mauricio RN   6/8/2023 12:46 PM CDT Back to Top      Attempted to call, left vm asking return call.      Thanks,  MARIA L Kim  [facility]     Lise Mancilla PA-C   6/7/2023  1:05 PM CDT       Please call pt.  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  This is usually cold sores on the mouth but can be found in genital herpes as well.  If she has had oral cold sores in the past then this is what this is from.  There is nothing we need to do now.  If she gets a blister on her mouth or in her vaginal area then we can do treatment.  It is contagious -mostly when there are current lesions.          Lise Mancilla PA-C

## 2023-06-09 LAB
BKR LAB AP GYN ADEQUACY: NORMAL
BKR LAB AP GYN INTERPRETATION: NORMAL
BKR LAB AP HPV REFLEX: NORMAL
BKR LAB AP LMP: NORMAL
BKR LAB AP PREVIOUS ABNL DX: NORMAL
BKR LAB AP PREVIOUS ABNORMAL: NORMAL
PATH REPORT.COMMENTS IMP SPEC: NORMAL
PATH REPORT.COMMENTS IMP SPEC: NORMAL
PATH REPORT.RELEVANT HX SPEC: NORMAL

## 2023-06-13 ENCOUNTER — PATIENT OUTREACH (OUTPATIENT)
Dept: FAMILY MEDICINE | Facility: CLINIC | Age: 28
End: 2023-06-13
Payer: COMMERCIAL

## 2023-06-13 LAB
HUMAN PAPILLOMA VIRUS 16 DNA: NEGATIVE
HUMAN PAPILLOMA VIRUS 18 DNA: NEGATIVE
HUMAN PAPILLOMA VIRUS FINAL DIAGNOSIS: NORMAL
HUMAN PAPILLOMA VIRUS OTHER HR: NEGATIVE

## 2023-06-13 NOTE — LETTER
June 13, 2023      Yennyperez Ortiz  [address] [place] [address]  [facility] [address]        Dear ,    We are happy to inform you that your recent Pap smear and Human Papillomavirus (HPV) test results are normal and negative.    It is recommended that you have your next Pap smear and Human Papillomavirus (HPV) test in 1 year. You will also need to return to the clinic every year for an annual wellness visit.    If you have additional questions regarding this result, please contact our office and we will be happy to assist you.      Sincerely,    Your [facility] Care Team

## 2023-08-16 ENCOUNTER — OFFICE VISIT (OUTPATIENT)
Dept: FAMILY MEDICINE | Facility: CLINIC | Age: 28
End: 2023-08-16
Payer: MEDICAID

## 2023-08-16 VITALS
TEMPERATURE: 99 F | DIASTOLIC BLOOD PRESSURE: 79 MMHG | HEIGHT: 68 IN | BODY MASS INDEX: 30.92 KG/M2 | WEIGHT: 204 LBS | HEART RATE: 91 BPM | OXYGEN SATURATION: 99 % | SYSTOLIC BLOOD PRESSURE: 120 MMHG

## 2023-08-16 DIAGNOSIS — G43.809 OTHER MIGRAINE WITHOUT STATUS MIGRAINOSUS, NOT INTRACTABLE: ICD-10-CM

## 2023-08-16 DIAGNOSIS — K08.9 EXTRACTION OF TOOTH NEEDED: ICD-10-CM

## 2023-08-16 DIAGNOSIS — Z01.818 PREOP GENERAL PHYSICAL EXAM: Primary | ICD-10-CM

## 2023-08-16 DIAGNOSIS — F33.0 MILD EPISODE OF RECURRENT MAJOR DEPRESSIVE DISORDER (H): ICD-10-CM

## 2023-08-16 DIAGNOSIS — F41.9 ANXIETY: ICD-10-CM

## 2023-08-16 DIAGNOSIS — Z33.1 PREGNANCY AS INCIDENTAL FINDING: ICD-10-CM

## 2023-08-16 LAB — HCG UR QL: POSITIVE

## 2023-08-16 PROCEDURE — 96127 BRIEF EMOTIONAL/BEHAV ASSMT: CPT | Performed by: PHYSICIAN ASSISTANT

## 2023-08-16 PROCEDURE — 81025 URINE PREGNANCY TEST: CPT | Performed by: PHYSICIAN ASSISTANT

## 2023-08-16 PROCEDURE — 99214 OFFICE O/P EST MOD 30 MIN: CPT | Performed by: PHYSICIAN ASSISTANT

## 2023-08-16 RX ORDER — SERTRALINE HYDROCHLORIDE 100 MG/1
100 TABLET, FILM COATED ORAL DAILY
Qty: 90 TABLET | Refills: 0 | Status: SHIPPED | OUTPATIENT
Start: 2023-08-16 | End: 2024-06-19

## 2023-08-16 RX ORDER — PROPRANOLOL HYDROCHLORIDE 80 MG/1
80 CAPSULE, EXTENDED RELEASE ORAL DAILY
Qty: 90 CAPSULE | Refills: 0 | Status: SHIPPED | OUTPATIENT
Start: 2023-08-16 | End: 2024-06-19

## 2023-08-16 ASSESSMENT — ANXIETY QUESTIONNAIRES
GAD7 TOTAL SCORE: 6
IF YOU CHECKED OFF ANY PROBLEMS ON THIS QUESTIONNAIRE, HOW DIFFICULT HAVE THESE PROBLEMS MADE IT FOR YOU TO DO YOUR WORK, TAKE CARE OF THINGS AT HOME, OR GET ALONG WITH OTHER PEOPLE: SOMEWHAT DIFFICULT
6. BECOMING EASILY ANNOYED OR IRRITABLE: SEVERAL DAYS
5. BEING SO RESTLESS THAT IT IS HARD TO SIT STILL: SEVERAL DAYS
3. WORRYING TOO MUCH ABOUT DIFFERENT THINGS: SEVERAL DAYS
1. FEELING NERVOUS, ANXIOUS, OR ON EDGE: SEVERAL DAYS
2. NOT BEING ABLE TO STOP OR CONTROL WORRYING: SEVERAL DAYS
7. FEELING AFRAID AS IF SOMETHING AWFUL MIGHT HAPPEN: NOT AT ALL
GAD7 TOTAL SCORE: 6

## 2023-08-16 ASSESSMENT — PATIENT HEALTH QUESTIONNAIRE - PHQ9
10. IF YOU CHECKED OFF ANY PROBLEMS, HOW DIFFICULT HAVE THESE PROBLEMS MADE IT FOR YOU TO DO YOUR WORK, TAKE CARE OF THINGS AT HOME, OR GET ALONG WITH OTHER PEOPLE: SOMEWHAT DIFFICULT
SUM OF ALL RESPONSES TO PHQ QUESTIONS 1-9: 9
SUM OF ALL RESPONSES TO PHQ QUESTIONS 1-9: 9
5. POOR APPETITE OR OVEREATING: SEVERAL DAYS

## 2023-08-16 NOTE — PROGRESS NOTES
[facility]  [address] [place] [address]  [place] [address]  Phone: [phone]  Primary Provider: Suellen Mancilla  Pre-op Performing Provider: SUELLEN MANCILLA      PREOPERATIVE EVALUATION:  Today's date: 8/16/2023    Yenny Ortiz is a 27 year old female who presents for a preoperative evaluation.      8/16/2023     9:13 AM   Additional Questions   Roomed by Jeanette   Accompanied by self       Surgical Information:  Surgery/Procedure: wisdom teeth   Surgery Location: [facility]   Surgeon: Rossy Jaimes  Surgery Date: 8-30-23   Time of Surgery: 10:30 am   Where patient plans to recover: At home with family  Fax number for surgical facility: [phone]    Assessment & Plan     The proposed surgical procedure is considered LOW risk.    Preop general physical exam  Pregnancy test came back positive.  Will need to delay surgery.   - HCG qualitative urine; Future    Other migraine without status migrainosus, not intractable  Stable.  refilled  - propranolol ER (INDERAL LA) 80 MG 24 hr capsule; Take 1 capsule (80 mg) by mouth daily    Anxiety  Has increased some.  She thinks due to weight gain but has no motivation to exercise.  Will look to find ways to exercise more.  No change in medication   - sertraline (ZOLOFT) 100 MG tablet; Take 1 tablet (100 mg) by mouth daily    Mild episode of recurrent major depressive disorder (H)  As above  - sertraline (ZOLOFT) 100 MG tablet; Take 1 tablet (100 mg) by mouth daily    Extraction of tooth needed  No concerns noted for surgery             - No identified additional risk factors other than previously addressed    Antiplatelet or Anticoagulation Medication Instructions:   - Patient is on no antiplatelet or anticoagulation medications.    Additional Medication Instructions:  Patient is to take all scheduled medications on the day of surgery    RECOMMENDATION:  APPROVAL GIVEN to proceed with proposed procedure, without further  diagnostic evaluation.            Subjective       HPI related to upcoming procedure: wisdom teeth removed -2 removed        8/16/2023     9:05 AM   Preop Questions   1. Have you ever had a heart attack or stroke? No   2. Have you ever had surgery on your heart or blood vessels, such as a stent placement, a coronary artery bypass, or surgery on an artery in your head, neck, heart, or legs? No   3. Do you have chest pain with activity? No   4. Do you have a history of  heart failure? No   5. Do you currently have a cold, bronchitis or symptoms of other infection? No   6. Do you have a cough, shortness of breath, or wheezing? No   7. Do you or anyone in your family have previous history of blood clots? YES - grandma    8. Do you or does anyone in your family have a serious bleeding problem such as prolonged bleeding following surgeries or cuts? UNKNOWN - doesn't think so, no personal history    9. Have you ever had problems with anemia or been told to take iron pills? No   10. Have you had any abnormal blood loss such as black, tarry or bloody stools, or abnormal vaginal bleeding? No   11. Have you ever had a blood transfusion? No   12. Are you willing to have a blood transfusion if it is medically needed before, during, or after your surgery? Yes   13. Have you or any of your relatives ever had problems with anesthesia? No   14. Do you have sleep apnea, excessive snoring or daytime drowsiness? No   15. Do you have any artifical heart valves or other implanted medical devices like a pacemaker, defibrillator, or continuous glucose monitor? No   16. Do you have artificial joints? No   17. Are you allergic to latex? No   18. Is there any chance that you may be pregnant? No     Does think she has less motivation due to mood.      Health Care Directive:  Patient does not have a Health Care Directive or Living Will: information given     Preoperative Review of :   reviewed - no record of controlled substances  prescribed.          Review of Systems  CONSTITUTIONAL: NEGATIVE for fever, chills, change in weight  ENT/MOUTH: NEGATIVE for ear, mouth and throat problems  RESP: NEGATIVE for significant cough or SOB  CV: NEGATIVE for chest pain, palpitations or peripheral edema  PSYCHIATRIC: decreased motivation     Patient Active Problem List    Diagnosis Date Noted    Atypical squamous cells of undetermined significance (ASCUS) on Papanicolaou smear of cervix 10/14/2020     Priority: Medium     10/7/20 ASCUS pap at 23 yo. Plan pap in 1 year   11/19/21 Lost to follow-up for pap tracking  6/6/23 NIL pap, neg HPV @ 28 yo. Plan: cotest in 1 year        Acne, unspecified acne type 07/13/2018     Priority: Medium    Mild episode of recurrent major depressive disorder (H) 07/13/2018     Priority: Medium    Other migraine without status migrainosus, not intractable 10/10/2017     Priority: Medium    Anxiety 10/10/2017     Priority: Medium      Past Medical History:   Diagnosis Date    Abnormal Pap smear of cervix 10/07/2020     No past surgical history on file.  Current Outpatient Medications   Medication Sig Dispense Refill    adapalene (DIFFERIN) 0.1 % external cream Apply to face once daily 45 g 1    doxycycline hyclate (VIBRAMYCIN) 100 MG capsule Take 1 capsule (100 mg) by mouth daily 90 capsule 3    propranolol ER (INDERAL LA) 80 MG 24 hr capsule Take 1 capsule (80 mg) by mouth daily 90 capsule 0    sertraline (ZOLOFT) 100 MG tablet Take 1 tablet (100 mg) by mouth daily 90 tablet 0    SUMAtriptan (IMITREX) 25 MG tablet Take 1-2 tablets (25-50 mg) by mouth at onset of headache for migraine 9 tablet 3       Allergies   Allergen Reactions    Amoxicillin      Hives          Social History     Tobacco Use    Smoking status: Never    Smokeless tobacco: Never   Substance Use Topics    Alcohol use: No     No family history on file.  History   Drug Use No         Objective     /79   Pulse 91   Temp 99  F (37.2  C) (Oral)   Ht  "1.727 m (5' 8\")   Wt 92.5 kg (204 lb)   LMP 07/15/2023 (Exact Date)   SpO2 99%   BMI 31.02 kg/m      Physical Exam  GENERAL APPEARANCE: healthy, alert and no distress  HENT: ear canals and TM's normal and nose and mouth without ulcers or lesions  RESP: lungs clear to auscultation - no rales, rhonchi or wheezes  CV: regular rate and rhythm, normal S1 S2, no S3 or S4 and no murmur, click or rub   ABDOMEN: soft, nontender, no HSM or masses and bowel sounds normal  NEURO: Normal strength and tone, sensory exam grossly normal, mentation intact and speech normal  PSYCH: mentation appears normal and affect normal/bright    No results for input(s): HGB, PLT, INR, NA, POTASSIUM, CR, A1C in the last 14338 hours.     Diagnostics:  No labs were ordered during this visit.   No EKG required for low risk surgery (cataract, skin procedure, breast biopsy, etc).    Revised Cardiac Risk Index (RCRI):  The patient has the following serious cardiovascular risks for perioperative complications:   - No serious cardiac risks = 0 points     RCRI Interpretation: 0 points: Class I (very low risk - 0.4% complication rate)         Signed Electronically by: Lise Mancilla PA-C  Copy of this evaluation report is provided to requesting physician.    Answers submitted by the patient for this visit:  Patient Health Questionnaire (Submitted on 8/16/2023)  If you checked off any problems, how difficult have these problems made it for you to do your work, take care of things at home, or get along with other people?: Somewhat difficult  PHQ9 TOTAL SCORE: 9    "

## 2023-08-16 NOTE — PATIENT INSTRUCTIONS
Try scheduling on your calendar 30min of exercise 3 days week -use that time to watch favorite show       For informational purposes only. Not to replace the advice of your health care provider. Copyright   2003, 2019 [facility]. All rights reserved. Clinically reviewed by Laney Alvarez MD. [employer] [id number] - REV .  Preparing for Your Surgery  Getting started  A nurse will call you to review your health history and instructions. They will give you an arrival time based on your scheduled surgery time. Please be ready to share:  Your doctor's clinic name and phone number  Your medical, surgical, and anesthesia history  A list of allergies and sensitivities  A list of medicines, including herbal treatments and over-the-counter drugs  Whether the patient has a legal guardian (ask how to send us the papers in advance)  Please tell us if you're pregnant--or if there's any chance you might be pregnant. Some surgeries may injure a fetus (unborn baby), so they require a pregnancy test. Surgeries that are safe for a fetus don't always need a test, and you can choose whether to have one.   If you have a child who's having surgery, please ask for a copy of Preparing for Your Child's Surgery.    Preparing for surgery  Within 10 to 30 days of surgery: Have a pre-op exam (sometimes called an H&P, or History and Physical). This can be done at a clinic or pre-operative center.  If you're having a , you may not need this exam. Talk to your care team.  At your pre-op exam, talk to your care team about all medicines you take. If you need to stop any medicines before surgery, ask when to start taking them again.  We do this for your safety. Many medicines can make you bleed too much during surgery. Some change how well surgery (anesthesia) drugs work.  Call your insurance company to let them know you're having surgery. (If you don't have insurance, call [phone].)  Call your clinic if there's any change in  your health. This includes signs of a cold or flu (sore throat, runny nose, cough, rash, fever). It also includes a scrape or scratch near the surgery site.  If you have questions on the day of surgery, call your hospital or surgery center.  Eating and drinking guidelines  For your safety: Unless your surgeon tells you otherwise, follow the guidelines below.  Eat and drink as usual until 8 hours before you arrive for surgery. After that, no food or milk.  Drink clear liquids until 2 hours before you arrive. These are liquids you can see through, like water, Gatorade, and Propel Water. They also include plain black coffee and tea (no cream or milk), candy, and breath mints. You can spit out gum when you arrive.  If you drink alcohol: Stop drinking it the night before surgery.  If your care team tells you to take medicine on the morning of surgery, it's okay to take it with a sip of water.  Preventing infection  Shower or bathe the night before and morning of your surgery. Follow the instructions your clinic gave you. (If no instructions, use regular soap.)  Don't shave or clip hair near your surgery site. We'll remove the hair if needed.  Don't smoke or vape the morning of surgery. You may chew nicotine gum up to 2 hours before surgery. A nicotine patch is okay.  Note: Some surgeries require you to completely quit smoking and nicotine. Check with your surgeon.  Your care team will make every effort to keep you safe from infection. We will:  Clean our hands often with soap and water (or an alcohol-based hand rub).  Clean the skin at your surgery site with a special soap that kills germs.  Give you a special gown to keep you warm. (Cold raises the risk of infection.)  Wear special hair covers, masks, gowns and gloves during surgery.  Give antibiotic medicine, if prescribed. Not all surgeries need antibiotics.  What to bring on the day of surgery  Photo ID and insurance card  Copy of your health care directive, if you  have one  Glasses and hearing aids (bring cases)  You can't wear contacts during surgery  Inhaler and eye drops, if you use them (tell us about these when you arrive)  CPAP machine or breathing device, if you use them  A few personal items, if spending the night  If you have . . .  A pacemaker, ICD (cardiac defibrillator) or other implant: Bring the ID card.  An implanted stimulator: Bring the remote control.  A legal guardian: Bring a copy of the certified (court-stamped) guardianship papers.  Please remove any jewelry, including body piercings. Leave jewelry and other valuables at home.  If you're going home the day of surgery  You must have a responsible adult drive you home. They should stay with you overnight as well.  If you don't have someone to stay with you, and you aren't safe to go home alone, we may keep you overnight. Insurance often won't pay for this.  After surgery  If it's hard to control your pain or you need more pain medicine, please call your surgeon's office.  Questions?   If you have any questions for your care team, list them here: _________________________________________________________________________________________________________________________________________________________________________ ____________________________________ ____________________________________ ____________________________________    How to Take Your Medication Before Surgery  - Take all of your medications before surgery as usual  - avoid ibuprofen for  week before the surgery-tylenol is fine

## 2023-08-18 ENCOUNTER — TELEPHONE (OUTPATIENT)
Dept: FAMILY MEDICINE | Facility: CLINIC | Age: 28
End: 2023-08-18
Payer: MEDICAID

## 2023-08-18 NOTE — TELEPHONE ENCOUNTER
Called patient and relayed provider's message below:    I don't prescribe this and I don't know if anyone here that does.  [facility]  [facility] will be able to help her with this.     Lise Mancilla PA-C      Patient states understanding and will seek [facility] from [facility], no further questions.    ASHA Juarez RN  [facility]

## 2023-08-18 NOTE — TELEPHONE ENCOUNTER
I don't prescribe this and I don't know if anyone here that does.  [facility]  They will be able to help her with this.    Lise Mancilla PA-C

## 2023-08-18 NOTE — TELEPHONE ENCOUNTER
Patient is calling. She had positive pregnancy test done 2023 at pre-op. Patient is wondering about  pill and if this is something that Lise Mancilla PA-C would be able to prescribe and send to pharmacy for her or how would patient go about obtaining this? Please advise.    Nalini Sotelo RN   [facility]

## 2023-09-11 NOTE — TELEPHONE ENCOUNTER
Prescription approved per [facility] Refill Protocol.    Nalini Chu RN  [facility]   Quality 130: Documentation Of Current Medications In The Medical Record: Current Medications Documented Quality 402: Tobacco Use And Help With Quitting Among Adolescents: Patient screened for tobacco and never smoked Detail Level: Detailed Quality 226: Preventive Care And Screening: Tobacco Use: Screening And Cessation Intervention: Patient screened for tobacco use and is an ex/non-smoker

## 2023-09-12 ENCOUNTER — PATIENT OUTREACH (OUTPATIENT)
Dept: CARE COORDINATION | Facility: CLINIC | Age: 28
End: 2023-09-12
Payer: MEDICAID

## 2023-09-26 ENCOUNTER — PATIENT OUTREACH (OUTPATIENT)
Dept: CARE COORDINATION | Facility: CLINIC | Age: 28
End: 2023-09-26
Payer: MEDICAID

## 2024-05-16 ENCOUNTER — PATIENT OUTREACH (OUTPATIENT)
Dept: FAMILY MEDICINE | Facility: CLINIC | Age: 29
End: 2024-05-16
Payer: MEDICAID

## 2024-05-16 PROBLEM — R87.610 ATYPICAL SQUAMOUS CELLS OF UNDETERMINED SIGNIFICANCE (ASCUS) ON PAPANICOLAOU SMEAR OF CERVIX: Status: ACTIVE | Noted: 2020-10-14

## 2024-05-16 NOTE — LETTER
May 16, 2024      Yennyperez Ortiz  [address] [place] [address]  [facility] [address]        Dear ,    This letter is to remind you that you are due for your follow-up Pap smear and Human Papillomavirus (HPV) test.    Please call [phone] to schedule your appointment at your earliest convenience.    If you have completed the appointment outside of the [facility] system, please have the records forwarded to our office. We will update your chart for your provider to review before your next annual wellness visit.     Thank you for choosing [facility]!      Sincerely,    Your [facility] Care Team

## 2024-06-02 ENCOUNTER — HEALTH MAINTENANCE LETTER (OUTPATIENT)
Age: 29
End: 2024-06-02

## 2024-06-19 ENCOUNTER — OFFICE VISIT (OUTPATIENT)
Dept: FAMILY MEDICINE | Facility: CLINIC | Age: 29
End: 2024-06-19
Payer: COMMERCIAL

## 2024-06-19 VITALS
DIASTOLIC BLOOD PRESSURE: 77 MMHG | SYSTOLIC BLOOD PRESSURE: 125 MMHG | HEIGHT: 68 IN | OXYGEN SATURATION: 99 % | WEIGHT: 224 LBS | HEART RATE: 87 BPM | RESPIRATION RATE: 18 BRPM | TEMPERATURE: 97.9 F | BODY MASS INDEX: 33.95 KG/M2

## 2024-06-19 DIAGNOSIS — F41.9 ANXIETY: ICD-10-CM

## 2024-06-19 DIAGNOSIS — Z12.4 CERVICAL CANCER SCREENING: Primary | ICD-10-CM

## 2024-06-19 DIAGNOSIS — G43.809 OTHER MIGRAINE WITHOUT STATUS MIGRAINOSUS, NOT INTRACTABLE: ICD-10-CM

## 2024-06-19 DIAGNOSIS — F33.0 MILD EPISODE OF RECURRENT MAJOR DEPRESSIVE DISORDER (H): ICD-10-CM

## 2024-06-19 DIAGNOSIS — Z11.3 SCREEN FOR STD (SEXUALLY TRANSMITTED DISEASE): ICD-10-CM

## 2024-06-19 DIAGNOSIS — R87.610 ATYPICAL SQUAMOUS CELLS OF UNDETERMINED SIGNIFICANCE (ASCUS) ON PAPANICOLAOU SMEAR OF CERVIX: ICD-10-CM

## 2024-06-19 DIAGNOSIS — L70.0 ACNE VULGARIS: ICD-10-CM

## 2024-06-19 LAB
C TRACH DNA SPEC QL NAA+PROBE: NEGATIVE
CLUE CELLS: ABNORMAL
HCV AB SERPL QL IA: NONREACTIVE
HIV 1+2 AB+HIV1 P24 AG SERPL QL IA: NONREACTIVE
N GONORRHOEA DNA SPEC QL NAA+PROBE: NEGATIVE
T PALLIDUM AB SER QL: NONREACTIVE
TRICHOMONAS, WET PREP: ABNORMAL
WBC'S/HIGH POWER FIELD, WET PREP: ABNORMAL
YEAST, WET PREP: ABNORMAL

## 2024-06-19 PROCEDURE — 87210 SMEAR WET MOUNT SALINE/INK: CPT | Performed by: PHYSICIAN ASSISTANT

## 2024-06-19 PROCEDURE — G2211 COMPLEX E/M VISIT ADD ON: HCPCS | Performed by: PHYSICIAN ASSISTANT

## 2024-06-19 PROCEDURE — 86803 HEPATITIS C AB TEST: CPT | Performed by: PHYSICIAN ASSISTANT

## 2024-06-19 PROCEDURE — 86780 TREPONEMA PALLIDUM: CPT | Performed by: PHYSICIAN ASSISTANT

## 2024-06-19 PROCEDURE — 99214 OFFICE O/P EST MOD 30 MIN: CPT | Performed by: PHYSICIAN ASSISTANT

## 2024-06-19 PROCEDURE — 87389 HIV-1 AG W/HIV-1&-2 AB AG IA: CPT | Performed by: PHYSICIAN ASSISTANT

## 2024-06-19 PROCEDURE — 87491 CHLMYD TRACH DNA AMP PROBE: CPT | Performed by: PHYSICIAN ASSISTANT

## 2024-06-19 PROCEDURE — G0145 SCR C/V CYTO,THINLAYER,RESCR: HCPCS | Performed by: PHYSICIAN ASSISTANT

## 2024-06-19 PROCEDURE — 87591 N.GONORRHOEAE DNA AMP PROB: CPT | Performed by: PHYSICIAN ASSISTANT

## 2024-06-19 PROCEDURE — 87624 HPV HI-RISK TYP POOLED RSLT: CPT | Performed by: PHYSICIAN ASSISTANT

## 2024-06-19 PROCEDURE — 36415 COLL VENOUS BLD VENIPUNCTURE: CPT | Performed by: PHYSICIAN ASSISTANT

## 2024-06-19 RX ORDER — PROPRANOLOL HYDROCHLORIDE 80 MG/1
80 CAPSULE, EXTENDED RELEASE ORAL DAILY
Qty: 90 CAPSULE | Refills: 1 | Status: SHIPPED | OUTPATIENT
Start: 2024-06-19

## 2024-06-19 RX ORDER — SERTRALINE HYDROCHLORIDE 100 MG/1
100 TABLET, FILM COATED ORAL DAILY
Qty: 90 TABLET | Refills: 1 | Status: SHIPPED | OUTPATIENT
Start: 2024-06-19

## 2024-06-19 RX ORDER — SUMATRIPTAN 25 MG/1
25-50 TABLET, FILM COATED ORAL
Qty: 9 TABLET | Refills: 3 | Status: SHIPPED | OUTPATIENT
Start: 2024-06-19

## 2024-06-19 RX ORDER — DOXYCYCLINE 100 MG/1
100 CAPSULE ORAL DAILY
Qty: 90 CAPSULE | Refills: 3 | Status: SHIPPED | OUTPATIENT
Start: 2024-06-19

## 2024-06-19 ASSESSMENT — PATIENT HEALTH QUESTIONNAIRE - PHQ9
SUM OF ALL RESPONSES TO PHQ QUESTIONS 1-9: 5
10. IF YOU CHECKED OFF ANY PROBLEMS, HOW DIFFICULT HAVE THESE PROBLEMS MADE IT FOR YOU TO DO YOUR WORK, TAKE CARE OF THINGS AT HOME, OR GET ALONG WITH OTHER PEOPLE: SOMEWHAT DIFFICULT
SUM OF ALL RESPONSES TO PHQ QUESTIONS 1-9: 5

## 2024-06-19 NOTE — PROGRESS NOTES
"  Assessment & Plan     Cervical cancer screening  Follow up as needed  - Pap Screen Reflex to HPV if ASCUS - Recommended Age 25 - 29 Years    Atypical squamous cells of undetermined significance (ASCUS) on Papanicolaou smear of cervix  As above    Acne vulgaris  refilled  - doxycycline hyclate (VIBRAMYCIN) 100 MG capsule; Take 1 capsule (100 mg) by mouth daily    Other migraine without status migrainosus, not intractable  Refilled.  Discussed putting her medications by her toothbrush to help her remember to take regularly.    - propranolol ER (INDERAL LA) 80 MG 24 hr capsule; Take 1 capsule (80 mg) by mouth daily  - SUMAtriptan (IMITREX) 25 MG tablet; Take 1-2 tablets (25-50 mg) by mouth at onset of headache for migraine    Anxiety  As above  - sertraline (ZOLOFT) 100 MG tablet; Take 1 tablet (100 mg) by mouth daily    Mild episode of recurrent major depressive disorder (H24)  As above  - sertraline (ZOLOFT) 100 MG tablet; Take 1 tablet (100 mg) by mouth daily    Screen for STD (sexually transmitted disease)  Follow up as needed  - Neisseria gonorrhoeae PCR  - Chlamydia trachomatis PCR  - HIV Antigen Antibody Combo Cascade; Future  - Treponema Abs w Reflex to RPR and Titer; Future  - Hepatitis C antibody; Future  - Wet prep - Clinic Collect  - HIV Antigen Antibody Combo Cascade  - Treponema Abs w Reflex to RPR and Titer  - Hepatitis C antibody      The longitudinal plan of care for the diagnosis(es)/condition(s) as documented were addressed during this visit. Due to the added complexity in care, I will continue to support Yenny in the subsequent management and with ongoing continuity of care.    BMI  Estimated body mass index is 34.06 kg/m  as calculated from the following:    Height as of this encounter: 1.727 m (5' 8\").    Weight as of this encounter: 101.6 kg (224 lb).   Weight management plan: didn't address           Subjective   Yenny is a 28 year old, presenting for the following health issues:  STD " "(And pap smear )        6/19/2024    10:44 AM   Additional Questions   Roomed by Jeanette   Accompanied by self     History of Present Illness       Reason for visit:  Pap smear & STD test    She eats 2-3 servings of fruits and vegetables daily.She consumes 1 sweetened beverage(s) daily.She exercises with enough effort to increase her heart rate 20 to 29 minutes per day.  She exercises with enough effort to increase her heart rate 3 or less days per week. She is missing 5 dose(s) of medications per week.     Not taking meds regularly.  Headaches have gotten worse since not taking meds but they were better when she was taking regularly.  She just forgets to take.      No vaginal symptoms or new partners  Normal period 6/6/24.  Still doesn't want contraception.  Is using condoms.                    Objective    /77   Pulse 87   Temp 97.9  F (36.6  C) (Temporal)   Resp 18   Ht 1.727 m (5' 8\")   Wt 101.6 kg (224 lb)   LMP 06/06/2024 (Exact Date)   SpO2 99%   BMI 34.06 kg/m    Body mass index is 34.06 kg/m .  Physical Exam  Constitutional:       General: She is not in acute distress.  Genitourinary:     Vagina: Normal.      Cervix: Normal.   Neurological:      Mental Status: She is alert.   Psychiatric:         Mood and Affect: Mood normal.                    Signed Electronically by: Lise Mancilla PA-C    "

## 2024-06-24 LAB
BKR LAB AP GYN ADEQUACY: NORMAL
BKR LAB AP GYN INTERPRETATION: NORMAL
BKR LAB AP HPV REFLEX: NORMAL
BKR LAB AP PREVIOUS ABNL DX: NORMAL
BKR LAB AP PREVIOUS ABNORMAL: NORMAL
PATH REPORT.COMMENTS IMP SPEC: NORMAL
PATH REPORT.COMMENTS IMP SPEC: NORMAL
PATH REPORT.RELEVANT HX SPEC: NORMAL

## 2024-06-25 LAB
HPV HR 12 DNA CVX QL NAA+PROBE: NEGATIVE
HPV16 DNA CVX QL NAA+PROBE: NEGATIVE
HPV18 DNA CVX QL NAA+PROBE: NEGATIVE
HUMAN PAPILLOMA VIRUS FINAL DIAGNOSIS: NORMAL

## 2024-10-23 ENCOUNTER — TRANSFERRED RECORDS (OUTPATIENT)
Dept: HEALTH INFORMATION MANAGEMENT | Facility: CLINIC | Age: 29
End: 2024-10-23
Payer: COMMERCIAL

## 2025-06-14 ENCOUNTER — HEALTH MAINTENANCE LETTER (OUTPATIENT)
Age: 30
End: 2025-06-14